# Patient Record
Sex: MALE | Race: WHITE | NOT HISPANIC OR LATINO | Employment: FULL TIME | URBAN - METROPOLITAN AREA
[De-identification: names, ages, dates, MRNs, and addresses within clinical notes are randomized per-mention and may not be internally consistent; named-entity substitution may affect disease eponyms.]

---

## 2017-04-28 ENCOUNTER — HOSPITAL ENCOUNTER (INPATIENT)
Facility: CLINIC | Age: 32
LOS: 8 days | Discharge: HOME | DRG: 339 | End: 2017-05-06
Attending: EMERGENCY MEDICINE | Admitting: HOSPITALIST
Payer: COMMERCIAL

## 2017-04-28 ENCOUNTER — APPOINTMENT (OUTPATIENT)
Dept: CT IMAGING | Facility: CLINIC | Age: 32
DRG: 339 | End: 2017-04-28
Attending: EMERGENCY MEDICINE
Payer: COMMERCIAL

## 2017-04-28 ENCOUNTER — ANESTHESIA EVENT (OUTPATIENT)
Dept: SURGERY | Facility: CLINIC | Age: 32
DRG: 339 | End: 2017-04-28
Payer: COMMERCIAL

## 2017-04-28 ENCOUNTER — ANESTHESIA (OUTPATIENT)
Dept: SURGERY | Facility: CLINIC | Age: 32
DRG: 339 | End: 2017-04-28
Payer: COMMERCIAL

## 2017-04-28 DIAGNOSIS — E86.0 DEHYDRATION: ICD-10-CM

## 2017-04-28 DIAGNOSIS — K35.209 ACUTE APPENDICITIS WITH GENERALIZED PERITONITIS: ICD-10-CM

## 2017-04-28 DIAGNOSIS — R10.84 GENERALIZED ABDOMINAL PAIN: ICD-10-CM

## 2017-04-28 DIAGNOSIS — N28.9 ACUTE RENAL INSUFFICIENCY: ICD-10-CM

## 2017-04-28 DIAGNOSIS — G89.18 ACUTE POST-OPERATIVE PAIN: Primary | ICD-10-CM

## 2017-04-28 LAB
ALBUMIN SERPL-MCNC: 3 G/DL (ref 3.4–5)
ALBUMIN UR-MCNC: 30 MG/DL
ALP SERPL-CCNC: 70 U/L (ref 40–150)
ALT SERPL W P-5'-P-CCNC: 62 U/L (ref 0–70)
ANION GAP SERPL CALCULATED.3IONS-SCNC: 12 MMOL/L (ref 3–14)
APPEARANCE UR: ABNORMAL
AST SERPL W P-5'-P-CCNC: 51 U/L (ref 0–45)
BACTERIA #/AREA URNS HPF: ABNORMAL /HPF
BASOPHILS # BLD AUTO: 0 10E9/L (ref 0–0.2)
BASOPHILS NFR BLD AUTO: 0.3 %
BILIRUB DIRECT SERPL-MCNC: 1.1 MG/DL (ref 0–0.2)
BILIRUB SERPL-MCNC: 2.6 MG/DL (ref 0.2–1.3)
BILIRUB UR QL STRIP: NEGATIVE
BUN SERPL-MCNC: 84 MG/DL (ref 7–30)
CALCIUM SERPL-MCNC: 8.9 MG/DL (ref 8.5–10.1)
CHLORIDE SERPL-SCNC: 84 MMOL/L (ref 94–109)
CO2 SERPL-SCNC: 29 MMOL/L (ref 20–32)
COLOR UR AUTO: YELLOW
CREAT SERPL-MCNC: 2.96 MG/DL (ref 0.66–1.25)
CREAT UR-MCNC: 112 MG/DL
DIFFERENTIAL METHOD BLD: ABNORMAL
EOSINOPHIL # BLD AUTO: 0 10E9/L (ref 0–0.7)
EOSINOPHIL NFR BLD AUTO: 0 %
ERYTHROCYTE [DISTWIDTH] IN BLOOD BY AUTOMATED COUNT: 11.7 % (ref 10–15)
FRACT EXCRET NA UR+SERPL-RTO: 0.3 %
GFR SERPL CREATININE-BSD FRML MDRD: 24 ML/MIN/1.7M2
GLUCOSE SERPL-MCNC: 120 MG/DL (ref 70–99)
GLUCOSE UR STRIP-MCNC: NEGATIVE MG/DL
HCT VFR BLD AUTO: 44.4 % (ref 40–53)
HGB BLD-MCNC: 16.2 G/DL (ref 13.3–17.7)
HGB UR QL STRIP: ABNORMAL
HYALINE CASTS #/AREA URNS LPF: 2 /LPF (ref 0–2)
IMM GRANULOCYTES # BLD: 0.1 10E9/L (ref 0–0.4)
IMM GRANULOCYTES NFR BLD: 0.7 %
KETONES UR STRIP-MCNC: 5 MG/DL
LACTATE SERPL-SCNC: 1.1 MMOL/L (ref 0.4–2)
LEUKOCYTE ESTERASE UR QL STRIP: NEGATIVE
LIPASE SERPL-CCNC: 210 U/L (ref 73–393)
LYMPHOCYTES # BLD AUTO: 0.6 10E9/L (ref 0.8–5.3)
LYMPHOCYTES NFR BLD AUTO: 8.6 %
MCH RBC QN AUTO: 33.3 PG (ref 26.5–33)
MCHC RBC AUTO-ENTMCNC: 36.5 G/DL (ref 31.5–36.5)
MCV RBC AUTO: 91 FL (ref 78–100)
MONOCYTES # BLD AUTO: 0.8 10E9/L (ref 0–1.3)
MONOCYTES NFR BLD AUTO: 10.8 %
MUCOUS THREADS #/AREA URNS LPF: PRESENT /LPF
NEUTROPHILS # BLD AUTO: 5.8 10E9/L (ref 1.6–8.3)
NEUTROPHILS NFR BLD AUTO: 79.6 %
NITRATE UR QL: NEGATIVE
NRBC # BLD AUTO: 0 10*3/UL
NRBC BLD AUTO-RTO: 0 /100
PH UR STRIP: 5 PH (ref 5–7)
PLATELET # BLD AUTO: 158 10E9/L (ref 150–450)
PLATELET # BLD EST: NORMAL 10*3/UL
POTASSIUM SERPL-SCNC: 3.1 MMOL/L (ref 3.4–5.3)
PROT SERPL-MCNC: 8.4 G/DL (ref 6.8–8.8)
RBC # BLD AUTO: 4.87 10E12/L (ref 4.4–5.9)
RBC #/AREA URNS AUTO: 3 /HPF (ref 0–2)
RBC MORPH BLD: ABNORMAL
SODIUM SERPL-SCNC: 125 MMOL/L (ref 133–144)
SODIUM UR-SCNC: 14 MMOL/L
SP GR UR STRIP: 1.01 (ref 1–1.03)
SQUAMOUS #/AREA URNS AUTO: 1 /HPF (ref 0–1)
URN SPEC COLLECT METH UR: ABNORMAL
UROBILINOGEN UR STRIP-MCNC: 2 MG/DL (ref 0–2)
VARIANT LYMPHS BLD QL SMEAR: PRESENT
WBC # BLD AUTO: 7.3 10E9/L (ref 4–11)
WBC #/AREA URNS AUTO: 7 /HPF (ref 0–2)

## 2017-04-28 PROCEDURE — 25800025 ZZH RX 258: Performed by: EMERGENCY MEDICINE

## 2017-04-28 PROCEDURE — 0WJP0ZZ INSPECTION OF GASTROINTESTINAL TRACT, OPEN APPROACH: ICD-10-PCS | Performed by: SURGERY

## 2017-04-28 PROCEDURE — 25000128 H RX IP 250 OP 636: Performed by: EMERGENCY MEDICINE

## 2017-04-28 PROCEDURE — 96375 TX/PRO/DX INJ NEW DRUG ADDON: CPT

## 2017-04-28 PROCEDURE — 87186 SC STD MICRODIL/AGAR DIL: CPT | Performed by: SURGERY

## 2017-04-28 PROCEDURE — 27210794 ZZH OR GENERAL SUPPLY STERILE: Performed by: SURGERY

## 2017-04-28 PROCEDURE — 74176 CT ABD & PELVIS W/O CONTRAST: CPT

## 2017-04-28 PROCEDURE — 96366 THER/PROPH/DIAG IV INF ADDON: CPT

## 2017-04-28 PROCEDURE — 36000056 ZZH SURGERY LEVEL 3 1ST 30 MIN: Performed by: SURGERY

## 2017-04-28 PROCEDURE — 81001 URINALYSIS AUTO W/SCOPE: CPT | Performed by: EMERGENCY MEDICINE

## 2017-04-28 PROCEDURE — 12000000 ZZH R&B MED SURG/OB

## 2017-04-28 PROCEDURE — 87077 CULTURE AEROBIC IDENTIFY: CPT | Performed by: SURGERY

## 2017-04-28 PROCEDURE — 87205 SMEAR GRAM STAIN: CPT | Performed by: SURGERY

## 2017-04-28 PROCEDURE — 25000128 H RX IP 250 OP 636: Performed by: NURSE ANESTHETIST, CERTIFIED REGISTERED

## 2017-04-28 PROCEDURE — 40000306 ZZH STATISTIC PRE PROC ASSESS II: Performed by: SURGERY

## 2017-04-28 PROCEDURE — 87076 CULTURE ANAEROBE IDENT EACH: CPT | Performed by: SURGERY

## 2017-04-28 PROCEDURE — 96365 THER/PROPH/DIAG IV INF INIT: CPT

## 2017-04-28 PROCEDURE — 88304 TISSUE EXAM BY PATHOLOGIST: CPT | Mod: 26 | Performed by: SURGERY

## 2017-04-28 PROCEDURE — 44960 APPENDECTOMY: CPT | Mod: 22 | Performed by: SURGERY

## 2017-04-28 PROCEDURE — 80076 HEPATIC FUNCTION PANEL: CPT | Performed by: EMERGENCY MEDICINE

## 2017-04-28 PROCEDURE — 83690 ASSAY OF LIPASE: CPT | Performed by: EMERGENCY MEDICINE

## 2017-04-28 PROCEDURE — 80048 BASIC METABOLIC PNL TOTAL CA: CPT | Performed by: EMERGENCY MEDICINE

## 2017-04-28 PROCEDURE — 83605 ASSAY OF LACTIC ACID: CPT | Performed by: EMERGENCY MEDICINE

## 2017-04-28 PROCEDURE — 27210995 ZZH RX 272: Performed by: SURGERY

## 2017-04-28 PROCEDURE — 84300 ASSAY OF URINE SODIUM: CPT | Performed by: HOSPITALIST

## 2017-04-28 PROCEDURE — 25000125 ZZHC RX 250: Performed by: SURGERY

## 2017-04-28 PROCEDURE — 25000128 H RX IP 250 OP 636: Performed by: SURGERY

## 2017-04-28 PROCEDURE — 44960 APPENDECTOMY: CPT | Mod: AS | Performed by: PHYSICIAN ASSISTANT

## 2017-04-28 PROCEDURE — 85025 COMPLETE CBC W/AUTO DIFF WBC: CPT | Performed by: EMERGENCY MEDICINE

## 2017-04-28 PROCEDURE — 99222 1ST HOSP IP/OBS MODERATE 55: CPT | Mod: 57 | Performed by: SURGERY

## 2017-04-28 PROCEDURE — 88304 TISSUE EXAM BY PATHOLOGIST: CPT | Performed by: SURGERY

## 2017-04-28 PROCEDURE — 37000008 ZZH ANESTHESIA TECHNICAL FEE, 1ST 30 MIN: Performed by: SURGERY

## 2017-04-28 PROCEDURE — 87070 CULTURE OTHR SPECIMN AEROBIC: CPT | Performed by: SURGERY

## 2017-04-28 PROCEDURE — 99223 1ST HOSP IP/OBS HIGH 75: CPT | Mod: AI | Performed by: HOSPITALIST

## 2017-04-28 PROCEDURE — 25000128 H RX IP 250 OP 636

## 2017-04-28 PROCEDURE — 25000566 ZZH SEVOFLURANE, EA 15 MIN: Performed by: SURGERY

## 2017-04-28 PROCEDURE — 37000009 ZZH ANESTHESIA TECHNICAL FEE, EACH ADDTL 15 MIN: Performed by: SURGERY

## 2017-04-28 PROCEDURE — 87075 CULTR BACTERIA EXCEPT BLOOD: CPT | Performed by: SURGERY

## 2017-04-28 PROCEDURE — 96361 HYDRATE IV INFUSION ADD-ON: CPT

## 2017-04-28 PROCEDURE — 82570 ASSAY OF URINE CREATININE: CPT | Performed by: HOSPITALIST

## 2017-04-28 PROCEDURE — 0DTJ4ZZ RESECTION OF APPENDIX, PERCUTANEOUS ENDOSCOPIC APPROACH: ICD-10-PCS | Performed by: SURGERY

## 2017-04-28 PROCEDURE — 25800025 ZZH RX 258: Performed by: SURGERY

## 2017-04-28 PROCEDURE — 71000013 ZZH RECOVERY PHASE 1 LEVEL 1 EA ADDTL HR: Performed by: SURGERY

## 2017-04-28 PROCEDURE — 36000058 ZZH SURGERY LEVEL 3 EA 15 ADDTL MIN: Performed by: SURGERY

## 2017-04-28 PROCEDURE — C1765 ADHESION BARRIER: HCPCS | Performed by: SURGERY

## 2017-04-28 PROCEDURE — 25000125 ZZHC RX 250: Performed by: NURSE ANESTHETIST, CERTIFIED REGISTERED

## 2017-04-28 PROCEDURE — 99285 EMERGENCY DEPT VISIT HI MDM: CPT | Mod: 25

## 2017-04-28 PROCEDURE — 25000128 H RX IP 250 OP 636: Performed by: ANESTHESIOLOGY

## 2017-04-28 PROCEDURE — 71000012 ZZH RECOVERY PHASE 1 LEVEL 1 FIRST HR: Performed by: SURGERY

## 2017-04-28 RX ORDER — BUPIVACAINE HYDROCHLORIDE AND EPINEPHRINE 5; 5 MG/ML; UG/ML
INJECTION, SOLUTION PERINEURAL PRN
Status: DISCONTINUED | OUTPATIENT
Start: 2017-04-28 | End: 2017-04-29 | Stop reason: HOSPADM

## 2017-04-28 RX ORDER — FENTANYL CITRATE 50 UG/ML
INJECTION, SOLUTION INTRAMUSCULAR; INTRAVENOUS PRN
Status: DISCONTINUED | OUTPATIENT
Start: 2017-04-28 | End: 2017-04-29

## 2017-04-28 RX ORDER — ACETAMINOPHEN 10 MG/ML
INJECTION, SOLUTION INTRAVENOUS PRN
Status: DISCONTINUED | OUTPATIENT
Start: 2017-04-28 | End: 2017-04-29

## 2017-04-28 RX ORDER — ONDANSETRON 2 MG/ML
INJECTION INTRAMUSCULAR; INTRAVENOUS
Status: COMPLETED
Start: 2017-04-28 | End: 2017-04-28

## 2017-04-28 RX ORDER — HYDROMORPHONE HYDROCHLORIDE 1 MG/ML
INJECTION, SOLUTION INTRAMUSCULAR; INTRAVENOUS; SUBCUTANEOUS
Status: COMPLETED
Start: 2017-04-28 | End: 2017-04-28

## 2017-04-28 RX ORDER — ONDANSETRON 2 MG/ML
4 INJECTION INTRAMUSCULAR; INTRAVENOUS ONCE
Status: DISCONTINUED | OUTPATIENT
Start: 2017-04-28 | End: 2017-04-29

## 2017-04-28 RX ORDER — KETAMINE HYDROCHLORIDE 10 MG/ML
INJECTION, SOLUTION INTRAMUSCULAR; INTRAVENOUS PRN
Status: DISCONTINUED | OUTPATIENT
Start: 2017-04-28 | End: 2017-04-29

## 2017-04-28 RX ORDER — MAGNESIUM HYDROXIDE 1200 MG/15ML
LIQUID ORAL PRN
Status: DISCONTINUED | OUTPATIENT
Start: 2017-04-28 | End: 2017-04-29 | Stop reason: HOSPADM

## 2017-04-28 RX ORDER — ERTAPENEM 1 G/1
1 INJECTION, POWDER, LYOPHILIZED, FOR SOLUTION INTRAMUSCULAR; INTRAVENOUS ONCE
Status: COMPLETED | OUTPATIENT
Start: 2017-04-28 | End: 2017-04-28

## 2017-04-28 RX ORDER — ONDANSETRON 2 MG/ML
INJECTION INTRAMUSCULAR; INTRAVENOUS PRN
Status: DISCONTINUED | OUTPATIENT
Start: 2017-04-28 | End: 2017-04-29

## 2017-04-28 RX ORDER — DEXAMETHASONE SODIUM PHOSPHATE 4 MG/ML
INJECTION, SOLUTION INTRA-ARTICULAR; INTRALESIONAL; INTRAMUSCULAR; INTRAVENOUS; SOFT TISSUE PRN
Status: DISCONTINUED | OUTPATIENT
Start: 2017-04-28 | End: 2017-04-29

## 2017-04-28 RX ORDER — LIDOCAINE HYDROCHLORIDE 10 MG/ML
INJECTION, SOLUTION INFILTRATION; PERINEURAL PRN
Status: DISCONTINUED | OUTPATIENT
Start: 2017-04-28 | End: 2017-04-29

## 2017-04-28 RX ORDER — NEOSTIGMINE METHYLSULFATE 1 MG/ML
VIAL (ML) INJECTION PRN
Status: DISCONTINUED | OUTPATIENT
Start: 2017-04-28 | End: 2017-04-29

## 2017-04-28 RX ORDER — SODIUM CHLORIDE 9 MG/ML
INJECTION, SOLUTION INTRAVENOUS CONTINUOUS
Status: DISCONTINUED | OUTPATIENT
Start: 2017-04-28 | End: 2017-04-29

## 2017-04-28 RX ORDER — SODIUM CHLORIDE, SODIUM LACTATE, POTASSIUM CHLORIDE, CALCIUM CHLORIDE 600; 310; 30; 20 MG/100ML; MG/100ML; MG/100ML; MG/100ML
INJECTION, SOLUTION INTRAVENOUS CONTINUOUS
Status: DISCONTINUED | OUTPATIENT
Start: 2017-04-28 | End: 2017-04-29

## 2017-04-28 RX ORDER — PROPOFOL 10 MG/ML
INJECTION, EMULSION INTRAVENOUS PRN
Status: DISCONTINUED | OUTPATIENT
Start: 2017-04-28 | End: 2017-04-29

## 2017-04-28 RX ORDER — GLYCOPYRROLATE 0.2 MG/ML
INJECTION, SOLUTION INTRAMUSCULAR; INTRAVENOUS PRN
Status: DISCONTINUED | OUTPATIENT
Start: 2017-04-28 | End: 2017-04-29

## 2017-04-28 RX ORDER — HYDROMORPHONE HYDROCHLORIDE 1 MG/ML
0.5 INJECTION, SOLUTION INTRAMUSCULAR; INTRAVENOUS; SUBCUTANEOUS
Status: DISCONTINUED | OUTPATIENT
Start: 2017-04-28 | End: 2017-04-29

## 2017-04-28 RX ADMIN — KETAMINE HYDROCHLORIDE 10 MG: 10 INJECTION, SOLUTION INTRAMUSCULAR; INTRAVENOUS at 22:24

## 2017-04-28 RX ADMIN — GLYCOPYRROLATE 0.06 MG: 0.2 INJECTION, SOLUTION INTRAMUSCULAR; INTRAVENOUS at 23:38

## 2017-04-28 RX ADMIN — MIDAZOLAM HYDROCHLORIDE 2 MG: 1 INJECTION, SOLUTION INTRAMUSCULAR; INTRAVENOUS at 20:55

## 2017-04-28 RX ADMIN — ROCURONIUM BROMIDE 10 MG: 10 INJECTION INTRAVENOUS at 21:55

## 2017-04-28 RX ADMIN — PHENYLEPHRINE HYDROCHLORIDE 100 MCG: 10 INJECTION, SOLUTION INTRAMUSCULAR; INTRAVENOUS; SUBCUTANEOUS at 22:53

## 2017-04-28 RX ADMIN — ROCURONIUM BROMIDE 20 MG: 10 INJECTION INTRAVENOUS at 22:34

## 2017-04-28 RX ADMIN — PROPOFOL 200 MG: 10 INJECTION, EMULSION INTRAVENOUS at 21:05

## 2017-04-28 RX ADMIN — LIDOCAINE HYDROCHLORIDE 30 MG: 10 INJECTION, SOLUTION INFILTRATION; PERINEURAL at 21:05

## 2017-04-28 RX ADMIN — ONDANSETRON 4 MG: 2 INJECTION INTRAMUSCULAR; INTRAVENOUS at 13:47

## 2017-04-28 RX ADMIN — HYDROMORPHONE HYDROCHLORIDE 1 MG: 1 INJECTION, SOLUTION INTRAMUSCULAR; INTRAVENOUS; SUBCUTANEOUS at 22:42

## 2017-04-28 RX ADMIN — HYDROMORPHONE HYDROCHLORIDE 0.5 MG: 1 INJECTION, SOLUTION INTRAMUSCULAR; INTRAVENOUS; SUBCUTANEOUS at 22:06

## 2017-04-28 RX ADMIN — ROCURONIUM BROMIDE 50 MG: 10 INJECTION INTRAVENOUS at 21:05

## 2017-04-28 RX ADMIN — SODIUM CHLORIDE: 9 INJECTION, SOLUTION INTRAVENOUS at 20:44

## 2017-04-28 RX ADMIN — KETAMINE HYDROCHLORIDE 10 MG: 10 INJECTION, SOLUTION INTRAMUSCULAR; INTRAVENOUS at 22:53

## 2017-04-28 RX ADMIN — ERTAPENEM SODIUM 1 G: 1 INJECTION, POWDER, LYOPHILIZED, FOR SOLUTION INTRAMUSCULAR; INTRAVENOUS at 16:39

## 2017-04-28 RX ADMIN — ROCURONIUM BROMIDE 20 MG: 10 INJECTION INTRAVENOUS at 23:10

## 2017-04-28 RX ADMIN — ACETAMINOPHEN 1000 MG: 10 INJECTION, SOLUTION INTRAVENOUS at 23:40

## 2017-04-28 RX ADMIN — FENTANYL CITRATE 150 MCG: 50 INJECTION, SOLUTION INTRAMUSCULAR; INTRAVENOUS at 21:05

## 2017-04-28 RX ADMIN — KETAMINE HYDROCHLORIDE 20 MG: 10 INJECTION, SOLUTION INTRAMUSCULAR; INTRAVENOUS at 21:22

## 2017-04-28 RX ADMIN — HYDROMORPHONE HYDROCHLORIDE 0.5 MG: 1 INJECTION, SOLUTION INTRAMUSCULAR; INTRAVENOUS; SUBCUTANEOUS at 15:49

## 2017-04-28 RX ADMIN — ONDANSETRON 4 MG: 2 INJECTION INTRAMUSCULAR; INTRAVENOUS at 23:24

## 2017-04-28 RX ADMIN — SODIUM CHLORIDE 1000 ML: 9 INJECTION, SOLUTION INTRAVENOUS at 13:47

## 2017-04-28 RX ADMIN — FENTANYL CITRATE 50 MCG: 50 INJECTION, SOLUTION INTRAMUSCULAR; INTRAVENOUS at 23:46

## 2017-04-28 RX ADMIN — FENTANYL CITRATE 50 MCG: 50 INJECTION, SOLUTION INTRAMUSCULAR; INTRAVENOUS at 21:17

## 2017-04-28 RX ADMIN — HYDROMORPHONE HYDROCHLORIDE 0.5 MG: 1 INJECTION, SOLUTION INTRAMUSCULAR; INTRAVENOUS; SUBCUTANEOUS at 21:42

## 2017-04-28 RX ADMIN — SODIUM CHLORIDE, POTASSIUM CHLORIDE, SODIUM LACTATE AND CALCIUM CHLORIDE: 600; 310; 30; 20 INJECTION, SOLUTION INTRAVENOUS at 20:59

## 2017-04-28 RX ADMIN — SODIUM CHLORIDE 500 ML: 9 INJECTION, SOLUTION INTRAVENOUS at 15:00

## 2017-04-28 RX ADMIN — DEXAMETHASONE SODIUM PHOSPHATE 4 MG: 4 INJECTION, SOLUTION INTRA-ARTICULAR; INTRALESIONAL; INTRAMUSCULAR; INTRAVENOUS; SOFT TISSUE at 21:05

## 2017-04-28 RX ADMIN — SODIUM CHLORIDE, POTASSIUM CHLORIDE, SODIUM LACTATE AND CALCIUM CHLORIDE: 600; 310; 30; 20 INJECTION, SOLUTION INTRAVENOUS at 22:25

## 2017-04-28 RX ADMIN — Medication 4 MG: at 23:38

## 2017-04-28 RX ADMIN — SODIUM CHLORIDE, POTASSIUM CHLORIDE, SODIUM LACTATE AND CALCIUM CHLORIDE: 600; 310; 30; 20 INJECTION, SOLUTION INTRAVENOUS at 16:30

## 2017-04-28 RX ADMIN — KETAMINE HYDROCHLORIDE 10 MG: 10 INJECTION, SOLUTION INTRAMUSCULAR; INTRAVENOUS at 23:24

## 2017-04-28 ASSESSMENT — ENCOUNTER SYMPTOMS
NAUSEA: 1
VOMITING: 1
CHILLS: 1
DIARRHEA: 1
ABDOMINAL PAIN: 1
APPETITE CHANGE: 1

## 2017-04-28 NOTE — H&P
HISTORY AND PHYSICAL     PRIMARY CARE PHYSICIAN:  None.      CHIEF COMPLAINT:  Abdominal pain.      HISTORY OF PRESENT ILLNESS:  Jaspreet Whaley is a healthy 31-year-old male presenting to [facility] for about 5 days of nausea, vomiting, diarrhea and abdominal pain.  The patient states that his symptoms began on Sunday.  At that time he began experiencing some nausea and vomiting.  He also describes some chills and sweats.  No objective fevers.  Over the course of the subsequent several days, he developed bilateral lower abdominal and almost pelvic pain.  This slowly actually migrated to the right side.  He has been anorexic with decreased appetite and not eaten much over the past several days.  He does report making urine, but it has been darker in color.  He reports adequate amount of urine output.  He reports having bowel movements, but more liquid consistency such as diarrhea.  No constipation.  Due to progression of his symptoms, he came to the Emergency Department for evaluation.  To note, he has been not using any medications like acetaminophen or ibuprofen over the past several days for pain management.      Upon presentation to the Emergency Department, the patient's vital signs showed a temperature of 97.8, heart rate 106, blood pressure 125/89, respiratory rate 16, oxygen saturation 98% on room air.  He had a somewhat distended abdomen. Labs showed sodium of 125, potassium 3.1, chloride 84, bicarbonate 29, BUN 84, creatinine 2.9, albumin 3.0, bilirubin 2.6, AST 51, and lipase of 210.  CBC is essentially normal.  Blood sugar is 120.  Urinalysis shows 5 ketones, 30 protein, small blood, 7 white blood cells, 3 red blood cells and few bacteria.  CT of the abdomen and pelvis without contrast was performed showing a prominent appendicolith at the base of the appendix, which appears to be thickened and findings are suspicious for appendicitis, possibly ruptured.  There is also within fluid  collection in the right lower quadrant along the lateral aspect of the cecum and ascending colon, which is suspicious for an abscess related to ruptured appendicitis.  There are multiple prominently dilated loops of mid and proximal small bowel with a zone of transition and a mildly thickened segment of mid small bowel in the left mid abdomen.  This could be consistent with a small-bowel obstruction.  There are diffuse fatty infiltration and trace amounts of free fluid in the pelvis.        Dr. Granger with General Surgery Service was consulted by phone.  He is currently in the operating room, but will be assessing the patient as soon as he is available.  The patient is currently on IV fluids and n.p.o.  He received a dose of Invanz.  The likelihood of going to the operating room tonight is very high.  The patient has no other complaints at this time.      PAST MEDICAL HISTORY:  None.      PRIOR TO ADMISSION MEDICATIONS:  None.      SOCIAL HISTORY:  The patient is single.  No children.  Parents at the bedside.  He does not smoke, drink or use drugs.      FAMILY HISTORY:  Assessed and noncontributory.      ALLERGIES:  No known drug allergies.      REVIEW OF SYSTEMS:  A 10-point review of systems was performed and the pertinent positive findings are presented in history present illness.  The remainder of the review of systems is negative.      PHYSICAL EXAMINATION:   VITAL SIGNS:  Temperature 97.8, heart rate 106, blood pressure 125/89, respiratory rate 16, oxygen saturation 98% on room air.   IN GENERAL:  No apparent distress, awake, alert and oriented x3.   HEENT:  Normocephalic, atraumatic.  Extraocular movements are intact.   NECK:  Supple without JVD.   CARDIOVASCULAR:  Mildly tachycardic, but regular.  No murmurs, rubs or gallops.   PULMONARY:  Clear to auscultation bilaterally.   ABDOMEN:  Soft but tender to palpation diffusely, most exquisitely in the right lower quadrant.  Mildly distended.  Some rebound as  well as voluntary guarding.   EXTREMITIES:  No cyanosis or clubbing.  No edema.   SKIN:  No rashes, ulcers or jaundice.   NEUROLOGICAL:  Cranial nerves II through XII are grossly intact.  No focal neurologic deficits.   PSYCHIATRIC:  Mood and affect are appropriate.      LABORATORY AND IMAGING:  Personally reviewed and discussed pertinent findings in the HPI.      ASSESSMENT AND PLAN:  This is a 31-year-old fundamentally healthy male presenting to [facility] for evaluation of nausea, vomiting and abdominal pain, and ultimately found to have a likely ruptured appendicitis as well as acute renal failure and hyponatremia.   1.  Suspected acute ruptured appendicitis:  Computerized tomography findings are somewhat limited due to lack of contrast; however, his right lower quadrant pain as well as the computerized tomography findings do suggest likely a case of ruptured appendicitis.  He has been given a dose of ertapenem.  We will transition him over to Zosyn.  [facility] General Surgery consultation has been requested and the patient will likely need to go to the operating room tonight.  We will continue intravenous fluids as well as n.p.o. status.  Pain and nausea medications have been ordered, but he appears relatively comfortable for the time being.  He is hemodynamically stable at this time.  A lactic acid level was pending.   2.  Possible small-bowel obstruction:  Again, computerized tomography findings are limited due to no contrast.  He does report having stool output, but in the form of diarrhea.  He could have a bowel obstruction related to his appendicitis, but this has not been confirmed.  Again, Surgery consultation has been requested to evaluate these intra-abdominal issues further.   3.  Acute renal failure:  The patient's creatinine is elevated to nearly 3.0.  At baseline, he has normal renal function.  This is almost certainly prerenal with possible component of ischemic acute tubular necrosis.   We will continue normal saline at 125 cc an hour and closely monitor his urine output.  He does report making adequate amounts of urine at home.  He denies intake of any other nephrotoxic agents, particularly NSAIDs.  I did add a FENA on, which is 0.3 further supporting prerenal etiology.   4.  Hypovolemic hyponatremia:  Likely acute in the setting of nausea and vomiting with limited oral intake.  We will provide normal saline and recheck a sodium level later tonight as well as tomorrow morning.   5.  Hyperbilirubinemia:  Likely related to sepsis.  We will recheck this later on.  No obvious biliary obstructive issues are noted.   6.  The patient will be admitted to inpatient status.   7.  Code status:  Full code.         KENDRA DANG MD             D: 2017 16:42   T: 2017 17:16   MT: JUN#[id number]      Name:     ALMA MORAN   MRN:      [id number]        Account:      [id number]   :      1985           Admitted:     [id number]      Document: [id number]

## 2017-04-28 NOTE — IP AVS SNAPSHOT
MRN:[id number]                      After Visit Summary   4/28/2017    Jaspreet Whaley    MRN: [id number]           Thank you!     Thank you for choosing [facility] for your care. Our goal is always to provide you with excellent care. Hearing back from our patients is one way we can continue to improve our services. Please take a few minutes to complete the written survey that you may receive in the mail after you visit. If you would like to speak to someone directly about your visit please contact Patient Relations at [phone]. Thank you!          Patient Information     Date Of Birth          1985        Designated Caregiver       Most Recent Value    Caregiver    Will someone help with your care after discharge? yes    Name of designated caregiver mom and dad,Domenica and Kwabena Whaley    Phone number of caregiver [phone]    Caregiver address [address]      About your hospital stay     You were admitted on:  April 28, 2017 You last received care in the:  [facility]    You were discharged on:  May 6, 2017       Who to Call     For medical emergencies, please call 911.  For non-urgent questions about your medical care, please call your primary care provider or clinic, [phone]  For questions related to your surgery, please call your surgery clinic        Attending Provider     Provider Specialty    Lukas Stone MD Emergency Medicine    Bath, Jean Kapadia MD Surgery    Johnathon Palacio DO Internal Medicine       Primary Care Provider Office Phone # Fax #    Sam DYAN Serrano [phone]       [facility] [place] [address]  [facility] [address]        Further instructions from your care team       A post hospital follow up has been scheduled for you with your PCP, Dr. Serrano at [facility] in [place].    Appointment scheduled for 5/15 at 11:00am.    Please arrive 15 minutes prior to  appointment.  Please bring photo ID and Insurance information.    Clinic Address  [address] [place] [address]    HOME CARE FOLLOWING ABDOMINAL SURGERY  GISELA Kirkland, JOHNNY Ott D. Maurer, KELVIN Escobedo     Special instructions for Jaspreet Ku Judd:  --Staples to be removed May 9 or 10  -Antibiotic to be taken twice daily for 3 days (Augmentin: Amoxicillin/Clavulanate)     INCISIONAL CARE:  Replace the bandage over your incision (or incisions) until all drainage stops, or if more comfortable to have in place.  If present, leave the steri-strips (white paper tapes) in place for 14 days after surgery.  If you have staples in your incision at the time of discharge, they will be removed at your follow-up appointment.  If Dermabond (a type of skin glue) is present, leave in place until it wears/flakes off.     BATHING:  Avoid baths for 1 week after surgery.  Showers are okay.  You may wash your hair at any time.  Gently pat your incision dry after bathing.    ACTIVITY:  Light Activity -- you may immediately be up and about as tolerated.  Driving -- you may drive when comfortable and off narcotic pain medications.  Light Work -- resume when comfortable off pain medications.  (If you can drive, you probably can work.)  Strenuous Work/Activity -- limit lifting to 20 pounds for 4 weeks.  Then, progressively increase with time.  Active Sports (running, biking, etc.) -- cautiously resume after 6 weeks.    DISCOMFORT:  Use pain medications as prescribed by your surgeon.  Take the pain medication with some food, when possible, to minimize side effects.  Expect gradual improvement.    DIET:  Return to diet you were on before surgery, unless you are given specific diet instructions.  Drink plenty of fluids.  While taking pain medications, increase dietary fiber or add a fiber supplementation like Metamucil or Citrucel to help prevent constipation - a possible side effect of  pain medications.    NAUSEA:  If nauseated from the anesthetic/pain meds; rest in bed, get up cautiously with assistance, and drink clear liquids (juice, tea, broth).    RETURN APPOINTMENT:  Schedule a follow-up visit 2-3 weeks after discharge from the hospital.  Office Phone:  [phone]     CONTACT US IF THE FOLLOWING DEVELOPS:   1. A fever that is above 101     2. If there is a large amount of drainage, bleeding, or swelling.   3. Severe pain that is not relieved by your prescription.   4. Drainage that is thick, cloudy, yellow, green or white.   5. Any other questions not answered by  Frequently Asked Questions  sheet.      FREQUENTLY ASKED QUESTIONS:    Q:  How should my incision look?    A:  Normally your incision will appear slightly swollen with light redness directly along the incision itself as it heals.  It may feel like a bump or ridge as the healing/scarring happens, and over time (3-4 months) this bump or ridge feeling should slowly go away.  In general, clear or pink watery drainage can be normal at first as your incision heals, but should decrease over time.    Q:  How do I know if my incision is infected?  A:  Look at your incision for signs of infection, like redness around the incision spreading to surrounding skin, or drainage of cloudy or foul-smelling drainage.  If you feel warm, check your temperature to see if you are running a fever.    **If any of these things occur, please notify the nurse at our office.  We may need you to come into the office for an incision check.      Q:  How do I take care of my incision?  A:  If you have a dressing in place - Starting the day after surgery, replace the dressing 1-2 times a day until there is no further drainage from the incision.  At that time, a dressing is no longer needed.  Try to minimize tape on the skin if irritation is occurring at the tape sites.  If you have significant irritation from tape on the skin, please call the office to discuss  other method of dressing your incision.    Small pieces of tape called  steri-strips  may be present directly overlying your incision; these may be removed 10 days after surgery unless otherwise specified by your surgeon.  If these tapes start to loosen at the ends, you may trim them back until they fall off or are removed.    A:  If you had  Dermabond  tissue glue used as a dressing (this causes your incision to look shiny with a clear covering over it) - This type of dressing wears off with time and does not require more dressings over the top unless it is draining around the glue as it wears off.  Do not apply ointments or lotions over the incisions until the glue has completely worn off.    Q:  There is a piece of tape or a sticky  lead  still on my skin.  Can I remove this?  A:  Sometimes the sticky  leads  used for monitoring during surgery or for evaluation in the emergency department are not all removed while you are in the hospital.  These sometimes have a tab or metal dot on them.  You can easily remove these on your own, like taking off a band-aid.  If there is a gel substance under the  lead , simply wipe/clean it off with a washcloth or paper towel.      Q:  What can I do to minimize constipation (very hard stools, or lack of stools)?  A:  Stay well hydrated.  Increase your dietary fiber intake or take a fiber supplement -with plenty of water.  Walk around frequently.  You may consider an over-the-counter stool-softener.  Your Pharmacist can assist you with choosing one that is stocked at your pharmacy.  Constipation is also one of the most common side effects of pain medication.  If you are using pain medication, be pro-active and try to PREVENT problems with constipation by taking the steps above BEFORE constipation becomes a problem.    Q:  What do I do if I need more pain medications?  A:  Call the office to receive refills.  Be aware that certain pain meds cannot be called into a pharmacy and  actually require a paper prescription.  A change may be made in your pain med as you progress thru your recovery period or if you have side effects to certain meds.    --Pain meds are NOT refilled after 5pm on weekdays, and NOT AT ALL on the weekends, so please look ahead to prevent problems.      Q:  Why am I having a hard time sleeping now that I am at home?  A:  Many medications you receive while you are in the hospital can impact your sleep for a number of days after your surgery/hospitalization.  Decreased level of activity and naps during the day may also make sleeping at night difficult.  Try to minimize day-time naps, and get up frequently during the day to walk around your home during your recovery time.  Sleep aides may be of some help, but are not recommended for long-term use.      Q:  I am having some back discomfort.  What should I do?  A:  This may be related to certain positioning that was required for your surgery, extended periods of time in bed, or other changes in your overall activity level.  You may try ice, heat, acetaminophen, or ibuprofen to treat this temporarily.  Note that many pain medications have acetaminophen in them and would state this on the prescription bottle.  Be sure not to exceed the maximum of 4000mg per day of acetaminophen.     **If the pain you are having does not resolve, is severe, or is a flare of back pain you have had on other occasions prior to surgery, please contact your primary physician for further recommendations or for an appointment to be examined at their office.    Q:  Why am I having headaches?  A:  Headaches can be caused by many things:  caffeine withdrawal, use of pain meds, dehydration, high blood pressure, lack of sleep, over-activity/exhaustion, flare-up of usual migraine headaches.  If you feel this is related to muscle tension (a band-like feeling around the head, or a pressure at the low-back of the head) you may try ice or heat to this area.  You  may need to drink more fluids (try electrolyte drink like Gatorade), rest, or take your usual migraine medications.   **If your headaches do not resolve, worsen, are accompanied by other symptoms, or if your blood pressure is high, please call your primary physician for recommendation and/or examination.    Q:  I am unable to urinate.  What do I do?  A:  A small percentage of people can have difficulty urinating initially after surgery.  This includes being able to urinate only a very small amount at a time and feeling discomfort or pressure in the very low abdomen.  This is called  urinary retention , and is actually an urgent situation.  Proceed to your nearest Emergency department for evaluation (not an Urgent Care Center).  Sometimes the bladder does not work correctly after certain medications you receive during surgery, or related to certain procedures.  You may need to have a catheter placed until your bladder recovers.  When planning to go to an Emergency department, it may help to call the ER to let them know you are coming in for this problem after a surgery.  This may help you get in quicker to be evaluated.  **If you have symptoms of a urinary tract infection, please contact your primary physician for the proper evaluation and treatment.          If you have other questions, please call the office Monday thru Friday between 8am and 5pm to discuss with the nurse or physician assistant.  #[phone]    There is a surgeon ON CALL on weekday evenings and over the weekend in case of urgent need only, and may be contacted at the same number.    If you are having an emergency, call 911 or proceed to your nearest emergency department.      [phone]      Pending Results     No orders found from 4/26/2017 to 4/29/2017.            Statement of Approval     Ordered          05/06/17 0837  I have reviewed and agree with all the recommendations and orders detailed in this document.  EFFECTIVE NOW     Approved  "and electronically signed by:  Sridhar Prather PA-C             Admission Information     Date & Time Provider Department Dept. Phone    2017 Johnathon Palacio DO [facility] [phone]      Your Vitals Were     Blood Pressure Pulse Temperature Respirations Height Weight    141/93 91 97.9  F (36.6  C) (Oral) 16 1.829 m (6') 90.5 kg (199 lb 8.3 oz)    Pulse Oximetry BMI (Body Mass Index)                97% 27.06 kg/m2          MyChart Information     [employer] lets you send messages to your doctor, view your test results, renew your prescriptions, schedule appointments and more. To sign up, go to [email or web address] . Click on \"Log in\" on the left side of the screen, which will take you to the Welcome page. Then click on \"Sign up Now\" on the right side of the page.     You will be asked to enter the access code listed below, as well as some personal information. Please follow the directions to create your username and password.     Your access code is: [email or web address]  Expires: 2017  4:48 PM     Your access code will  in 90 days. If you need help or a new code, please call your [place] clinic or [phone].        Care EveryWhere ID     This is your Care EveryWhere ID. This could be used by other organizations to access your [place] medical records  [id number]           Review of your medicines      START taking        Dose / Directions    amoxicillin-clavulanate 875-125 MG per tablet   Commonly known as:  AUGMENTIN        Dose:  1 tablet   Take 1 tablet by mouth 2 times daily for 3 days   Quantity:  6 tablet   Refills:  0       oxyCODONE 5 MG IR tablet   Commonly known as:  ROXICODONE   Used for:  Acute post-operative pain        Dose:  5-10 mg   Take 1-2 tablets (5-10 mg) by mouth every 6 hours as needed for severe pain   Quantity:  30 tablet   Refills:  0            Where to get your medicines      These medications were sent to [facility] - " [place] - [address] [address]  [address] [facility] [address]     Phone:  [phone]     amoxicillin-clavulanate 875-125 MG per tablet         Some of these will need a paper prescription and others can be bought over the counter. Ask your nurse if you have questions.     Bring a paper prescription for each of these medications     oxyCODONE 5 MG IR tablet                Protect others around you: Learn how to safely use, store and throw away your medicines at www.disposemymeds.org.             Medication List: This is a list of all your medications and when to take them. Check marks below indicate your daily home schedule. Keep this list as a reference.      Medications           Morning Afternoon Evening Bedtime As Needed    amoxicillin-clavulanate 875-125 MG per tablet   Commonly known as:  AUGMENTIN   Take 1 tablet by mouth 2 times daily for 3 days                                oxyCODONE 5 MG IR tablet   Commonly known as:  ROXICODONE   Take 1-2 tablets (5-10 mg) by mouth every 6 hours as needed for severe pain   Last time this was given:  10 mg on 5/6/2017 12:20 AM

## 2017-04-28 NOTE — ED NOTES
Pt ambulated to bed; now resting, in no apparent distress. Patient's airway, breathing and circulation are all intact without need for intervention at this time. Respiration regular and easy. Patient is alert and oriented x4. Skin warm, dry with color consistent with ethnicity. Bowel sounds are normoactive; abd is soft, slightly distended. No obvious signs of injury.

## 2017-04-28 NOTE — IP AVS SNAPSHOT
[facility]    [address]    [facility] [address]    Phone:  [phone]    Fax:  [phone]                                       After Visit Summary   4/28/2017    Jaspreet Whaley    MRN: [id number]           After Visit Summary Signature Page     I have received my discharge instructions, and my questions have been answered. I have discussed any challenges I see with this plan with the nurse or doctor.    ..........................................................................................................................................  Patient/Patient Representative Signature      ..........................................................................................................................................  Patient Representative Print Name and Relationship to Patient    ..................................................               ................................................  Date                                            Time    ..........................................................................................................................................  Reviewed by Signature/Title    ...................................................              ..............................................  Date                                                            Time

## 2017-04-28 NOTE — ED PROVIDER NOTES
History     Chief Complaint:  Nausea, Vomiting, & Diarrhea    HPI   Jaspreet Kings Whaley is a 31 year old male who presents with nausea, vomiting, and diarrhea. Patient reports his symptoms began five days ago when he started experiencing chills. This progressed to nausea, vomiting, and diarrhea. He had 4 episodes of diarrhea yesterday. Last vomited last night. He has had a reduced appetite since the symptoms began. He cannot recall eating anything unusual. He has also had some abdominal pain over the past few days. The pain is most significant in his RLQ. No prior abdominal surgeries.    Allergies:  The patient has no known drug allergies.    Medications:    The patient is currently on no regular medications.     Past Medical History:    History reviewed.  No significant past medical history.     Past Surgical History:    History reviewed.  No significant past surgical history.    Family History:    History reviewed.  No significant family history.     Social History:  Relationship status: Single  Tobacco use: Negative  Alcohol use: Positive  The patient presents with his parents.      Review of Systems   Constitutional: Positive for appetite change and chills.   Gastrointestinal: Positive for abdominal pain, diarrhea, nausea and vomiting.   All other systems reviewed and are negative.      Physical Exam   First Vitals:  BP: 125/89  Pulse: 106  Temp: 97.8  F (36.6  C)  Resp: 16  Height: 182.9 cm (6')  Weight: 85.3 kg (188 lb)  SpO2: 98 %    Physical Exam  Vital signs and nursing notes reviewed.     Constitutional: laying on gurney appears mildly uncomfortable  HENT: Oropharynx is clear and dry  Eyes: Conjunctivae are normal bilaterally. Pupils equal  Neck: normal range of motion  Cardiovascular: tachycardic rate, regular rhythm, normal heart sounds.   Pulmonary/Chest: Effort normal and breath sounds normal. No respiratory distress.   Abdominal: Soft. Bowel sounds are hypoactive. Vague discomfort throughout  abdomen, but most pronounced in RLQ.  Guarding to right lower abdomen, mild distention .   Musculoskeletal: No joint swelling or edema.   Neurological: Alert and oriented. No focal weakness  Skin: Skin is warm and dry. No rash noted.   Psych: normal affect      Emergency Department Course     Imaging:  Radiographic findings were communicated with the patient who voiced understanding of the findings.    CT Abdomen and Pelvis, w contrast, per radiology:   IMPRESSION:   1. There is a prominent appendicolith at the base of the appendix, and  the appendix appears thickened. Findings are suspicious for  appendicitis, possibly ruptured.  2. A thin fluid collection in the right lower quadrant along the  lateral aspect of the cecum and ascending colon is suspicious for  abscess related to a ruptured appendicitis.   3. There are multiple prominently dilated loops of mid and proximal  small bowel, with a zone of transition at a mildly thickened segment  of mid small bowel in the left midabdomen. Findings are suspicious for  a small-bowel obstruction. This appearance would be atypical for  ileus.  4. Diffuse fatty infiltration of the liver.  5. There is a trace amount of free fluid in the pelvis.    Laboratory:  CBC: WBC 7.3, HGB 16.2,   BMP: Na 125 (L), Potassium 3.1 (L), Chloride 84 (L), Glucose 120 (H), BUN 84 (H), GFR 24 (L), Creatinine 2.96 (H), o/w WNL  Lipase: 210  Hepatic Panel: Bilirubin 1.1 (H), Bilirubin 2.6 (H), Albumin 3.0 (L), AST 51 (H), o/w WNL  UA: Clear, yellow urine:, o/w WNL  Clostridium difficile PCR: Pending    Interventions:  1347: Normal Saline, 1000 mL, IV  1347: Zofran, 4 mg, IV injection   1549: Dilaudid, 0.5 mg, IV injection  : Invanz, 1 g, IV  : Zofran, 4 mg, IV injection    Emergency Department Course:  Nursing notes and vitals reviewed.  I performed an exam of the patient as documented above.  The above workup was undertaken.   I rechecked the patient and discussed results.  1534: I  discussed the patient with Radiologist.  1550: I discussed the patient with Dr. Granger of general surgery.  : I discussed the patient with Dr. Palacio of the hospitalist service.    Findings and plan explained to the Patient who consents to admission. Discussed the patient with Dr. Palacio, who will admit the patient to a medical bed for further monitoring, evaluation, and treatment.      Impression & Plan      Medical Decision Making:  Jaspreet Whaley is a 31 year old male with symptoms of fevers, chills about five days ago. Then developed abdominal pain, intermittent nausea and vomiting throughout the week, as well as diarrhea today. He was seen at outlying facility and appeared to be dehydrated, so they sent him here for evaluation.  On my exam, he had generalized abdominal tenderness that seemed most localized in RLQ. Therefore, labs were obtained, and plan was to do CT of abdomen with contrast. However, his creatinine came back elevated at 2.96. Therefore a non-contrast study was done. I reviewed this with the radiologist, who said it appeared this could be related to possible ruptured appendicitis with possible associated abscess, however, there were findings also reveal a possible obstructed etiology in the small intestine.. With patient's symptomatology and location of pain, I suspect probably appendicitis causing his symptoms, therefore Invanz was given.  IV fluid resuscitation was provided.  . I discussed case with Dr. Granger from General surgery for evaluation, he has a case in the OR and then will consult with the hospitalist.  I discussed the findings with the patient and he agrees with the plan.  I contacted Dr. Palacio who graciously agreed to accept the patient.     Diagnosis:    ICD-10-CM   1. Generalized abdominal pain R10.84   2. Acute renal insufficiency N28.9   3. Acute appendicitis with generalized peritonitis K35.2     Disposition:  Admit to the hospital pending surgical consultation.    I,  Edwardo Briscoe, am serving as a scribe on 4/28/2017 at 1:06 PM to personally document services performed by Lukas Stone MD, based on my observations and the provider's statements to me.    [facility] EMERGENCY DEPARTMENT       Lukas Stone MD  04/28/17 8822

## 2017-04-28 NOTE — PHARMACY-ADMISSION MEDICATION HISTORY
Admission medication history interview status for this patient is complete. See [facility] admission navigator for allergy information, prior to admission medications and immunization status.     Medication history interview source(s):Patient  Medication history resources (including written lists, pill bottles, clinic record):None  Primary pharmacy:n/a    Changes made to PTA medication list:  Added: none  Deleted: none  Changed: none    Actions taken by pharmacist (provider contacted, etc):None     Additional medication history information:None    Medication reconciliation/reorder completed by provider prior to medication history? No        For patients on insulin therapy: no  Lantus/levemir/NPH/Mix 70/30 dose:  _____   in AM/PM  or twice daily   Sliding scale Novolog Y/N  If Yes, do you have a baseline novolog pre-meal dose:  ______units with meals   Patients eat three meals a day:   Y/N     Any Barriers to therapy:  cost of medications/comfortable with giving injections (if applicable)/ comfortable and confident with current diabetes regimen       Prior to Admission medications    Not on File

## 2017-04-28 NOTE — ED NOTES
Pt complains of dehydration. States he went to [facility] and was told to come here for IV fluids.     States he started off with chills on Sunday, then progressed to n/v/d. Generalized abd pain and distention. 4 liquid stools yesterday. Last vomited yesterday.    Airway, breathing and circulation intact without need for intervention. Alert and interacting appropriately for age and situation.    HOME MEDICATIONS:  None

## 2017-04-29 PROBLEM — K37 APPENDICITIS: Status: ACTIVE | Noted: 2017-04-29

## 2017-04-29 LAB
ANION GAP SERPL CALCULATED.3IONS-SCNC: 10 MMOL/L (ref 3–14)
BASOPHILS # BLD AUTO: 0 10E9/L (ref 0–0.2)
BASOPHILS NFR BLD AUTO: 0 %
BUN SERPL-MCNC: 66 MG/DL (ref 7–30)
CALCIUM SERPL-MCNC: 7.4 MG/DL (ref 8.5–10.1)
CHLORIDE SERPL-SCNC: 96 MMOL/L (ref 94–109)
CO2 SERPL-SCNC: 24 MMOL/L (ref 20–32)
CREAT SERPL-MCNC: 2.34 MG/DL (ref 0.66–1.25)
DIFFERENTIAL METHOD BLD: ABNORMAL
EOSINOPHIL # BLD AUTO: 0 10E9/L (ref 0–0.7)
EOSINOPHIL NFR BLD AUTO: 0 %
ERYTHROCYTE [DISTWIDTH] IN BLOOD BY AUTOMATED COUNT: 12.1 % (ref 10–15)
GFR SERPL CREATININE-BSD FRML MDRD: 32 ML/MIN/1.7M2
GLUCOSE SERPL-MCNC: 142 MG/DL (ref 70–99)
GRAM STN SPEC: ABNORMAL
HCT VFR BLD AUTO: 41 % (ref 40–53)
HGB BLD-MCNC: 14.4 G/DL (ref 13.3–17.7)
LYMPHOCYTES # BLD AUTO: 0.6 10E9/L (ref 0.8–5.3)
LYMPHOCYTES NFR BLD AUTO: 11 %
Lab: ABNORMAL
MCH RBC QN AUTO: 33.1 PG (ref 26.5–33)
MCHC RBC AUTO-ENTMCNC: 35.1 G/DL (ref 31.5–36.5)
MCV RBC AUTO: 94 FL (ref 78–100)
METAMYELOCYTES # BLD: 1.8 10E9/L
METAMYELOCYTES NFR BLD MANUAL: 33 %
MICRO REPORT STATUS: ABNORMAL
MONOCYTES # BLD AUTO: 0.1 10E9/L (ref 0–1.3)
MONOCYTES NFR BLD AUTO: 2 %
MYELOCYTES # BLD: 1.2 10E9/L
MYELOCYTES NFR BLD MANUAL: 21 %
NEUTROPHILS # BLD AUTO: 1.8 10E9/L (ref 1.6–8.3)
NEUTROPHILS NFR BLD AUTO: 33 %
PLATELET # BLD AUTO: 167 10E9/L (ref 150–450)
PLATELET # BLD EST: NORMAL 10*3/UL
POTASSIUM SERPL-SCNC: 3.7 MMOL/L (ref 3.4–5.3)
RBC # BLD AUTO: 4.35 10E12/L (ref 4.4–5.9)
RBC MORPH BLD: ABNORMAL
SODIUM SERPL-SCNC: 130 MMOL/L (ref 133–144)
SPECIMEN SOURCE: ABNORMAL
WBC # BLD AUTO: 5.5 10E9/L (ref 4–11)

## 2017-04-29 PROCEDURE — 85025 COMPLETE CBC W/AUTO DIFF WBC: CPT | Performed by: SURGERY

## 2017-04-29 PROCEDURE — 80048 BASIC METABOLIC PNL TOTAL CA: CPT | Performed by: SURGERY

## 2017-04-29 PROCEDURE — S0028 INJECTION, FAMOTIDINE, 20 MG: HCPCS | Performed by: HOSPITALIST

## 2017-04-29 PROCEDURE — 25000125 ZZHC RX 250: Performed by: SURGERY

## 2017-04-29 PROCEDURE — 25000128 H RX IP 250 OP 636: Performed by: HOSPITALIST

## 2017-04-29 PROCEDURE — 25000128 H RX IP 250 OP 636: Performed by: NURSE ANESTHETIST, CERTIFIED REGISTERED

## 2017-04-29 PROCEDURE — 36415 COLL VENOUS BLD VENIPUNCTURE: CPT | Performed by: SURGERY

## 2017-04-29 PROCEDURE — 25000128 H RX IP 250 OP 636: Performed by: SURGERY

## 2017-04-29 PROCEDURE — 25000125 ZZHC RX 250: Performed by: HOSPITALIST

## 2017-04-29 PROCEDURE — 99233 SBSQ HOSP IP/OBS HIGH 50: CPT | Performed by: HOSPITALIST

## 2017-04-29 PROCEDURE — 12000007 ZZH R&B INTERMEDIATE

## 2017-04-29 PROCEDURE — 25000125 ZZHC RX 250: Performed by: NURSE ANESTHETIST, CERTIFIED REGISTERED

## 2017-04-29 RX ORDER — ACETAMINOPHEN 10 MG/ML
1000 INJECTION, SOLUTION INTRAVENOUS EVERY 8 HOURS
Status: DISCONTINUED | OUTPATIENT
Start: 2017-04-29 | End: 2017-05-02

## 2017-04-29 RX ORDER — SODIUM CHLORIDE, SODIUM LACTATE, POTASSIUM CHLORIDE, CALCIUM CHLORIDE 600; 310; 30; 20 MG/100ML; MG/100ML; MG/100ML; MG/100ML
INJECTION, SOLUTION INTRAVENOUS CONTINUOUS
Status: DISCONTINUED | OUTPATIENT
Start: 2017-04-29 | End: 2017-04-29 | Stop reason: HOSPADM

## 2017-04-29 RX ORDER — HYDROMORPHONE HYDROCHLORIDE 1 MG/ML
.3-.5 INJECTION, SOLUTION INTRAMUSCULAR; INTRAVENOUS; SUBCUTANEOUS
Status: DISCONTINUED | OUTPATIENT
Start: 2017-04-29 | End: 2017-04-29

## 2017-04-29 RX ORDER — ONDANSETRON 2 MG/ML
4 INJECTION INTRAMUSCULAR; INTRAVENOUS EVERY 30 MIN PRN
Status: DISCONTINUED | OUTPATIENT
Start: 2017-04-29 | End: 2017-04-29 | Stop reason: HOSPADM

## 2017-04-29 RX ORDER — ACETAMINOPHEN 325 MG/1
650 TABLET ORAL EVERY 4 HOURS PRN
Status: DISCONTINUED | OUTPATIENT
Start: 2017-04-29 | End: 2017-05-06 | Stop reason: HOSPADM

## 2017-04-29 RX ORDER — ONDANSETRON 4 MG/1
4 TABLET, ORALLY DISINTEGRATING ORAL EVERY 6 HOURS PRN
Status: DISCONTINUED | OUTPATIENT
Start: 2017-04-29 | End: 2017-04-29

## 2017-04-29 RX ORDER — LABETALOL HYDROCHLORIDE 5 MG/ML
10 INJECTION, SOLUTION INTRAVENOUS
Status: DISCONTINUED | OUTPATIENT
Start: 2017-04-29 | End: 2017-04-29 | Stop reason: HOSPADM

## 2017-04-29 RX ORDER — PROCHLORPERAZINE MALEATE 5 MG
5-10 TABLET ORAL EVERY 6 HOURS PRN
Status: DISCONTINUED | OUTPATIENT
Start: 2017-04-29 | End: 2017-05-06 | Stop reason: HOSPADM

## 2017-04-29 RX ORDER — ONDANSETRON 4 MG/1
4 TABLET, ORALLY DISINTEGRATING ORAL EVERY 30 MIN PRN
Status: DISCONTINUED | OUTPATIENT
Start: 2017-04-29 | End: 2017-04-29 | Stop reason: HOSPADM

## 2017-04-29 RX ORDER — PROCHLORPERAZINE MALEATE 5 MG
5-10 TABLET ORAL EVERY 6 HOURS PRN
Status: DISCONTINUED | OUTPATIENT
Start: 2017-04-29 | End: 2017-04-29

## 2017-04-29 RX ORDER — FENTANYL CITRATE 50 UG/ML
25-50 INJECTION, SOLUTION INTRAMUSCULAR; INTRAVENOUS
Status: DISCONTINUED | OUTPATIENT
Start: 2017-04-29 | End: 2017-04-29 | Stop reason: HOSPADM

## 2017-04-29 RX ORDER — ONDANSETRON 2 MG/ML
4 INJECTION INTRAMUSCULAR; INTRAVENOUS EVERY 6 HOURS PRN
Status: DISCONTINUED | OUTPATIENT
Start: 2017-04-29 | End: 2017-04-29

## 2017-04-29 RX ORDER — LIDOCAINE 40 MG/G
CREAM TOPICAL
Status: DISCONTINUED | OUTPATIENT
Start: 2017-04-29 | End: 2017-05-06 | Stop reason: HOSPADM

## 2017-04-29 RX ORDER — NALOXONE HYDROCHLORIDE 0.4 MG/ML
.1-.4 INJECTION, SOLUTION INTRAMUSCULAR; INTRAVENOUS; SUBCUTANEOUS
Status: DISCONTINUED | OUTPATIENT
Start: 2017-04-29 | End: 2017-05-06 | Stop reason: HOSPADM

## 2017-04-29 RX ORDER — HYDROMORPHONE HYDROCHLORIDE 1 MG/ML
.3-.5 INJECTION, SOLUTION INTRAMUSCULAR; INTRAVENOUS; SUBCUTANEOUS EVERY 5 MIN PRN
Status: DISCONTINUED | OUTPATIENT
Start: 2017-04-29 | End: 2017-04-29 | Stop reason: HOSPADM

## 2017-04-29 RX ORDER — HEPARIN SODIUM 5000 [USP'U]/.5ML
5000 INJECTION, SOLUTION INTRAVENOUS; SUBCUTANEOUS EVERY 8 HOURS
Status: DISCONTINUED | OUTPATIENT
Start: 2017-04-29 | End: 2017-05-06 | Stop reason: HOSPADM

## 2017-04-29 RX ORDER — SODIUM CHLORIDE 9 MG/ML
INJECTION, SOLUTION INTRAVENOUS CONTINUOUS
Status: DISCONTINUED | OUTPATIENT
Start: 2017-04-29 | End: 2017-05-06 | Stop reason: HOSPADM

## 2017-04-29 RX ORDER — ONDANSETRON 4 MG/1
4 TABLET, ORALLY DISINTEGRATING ORAL EVERY 6 HOURS PRN
Status: DISCONTINUED | OUTPATIENT
Start: 2017-04-29 | End: 2017-05-06 | Stop reason: HOSPADM

## 2017-04-29 RX ORDER — NALOXONE HYDROCHLORIDE 0.4 MG/ML
.1-.4 INJECTION, SOLUTION INTRAMUSCULAR; INTRAVENOUS; SUBCUTANEOUS
Status: DISCONTINUED | OUTPATIENT
Start: 2017-04-29 | End: 2017-04-29

## 2017-04-29 RX ORDER — ONDANSETRON 2 MG/ML
4 INJECTION INTRAMUSCULAR; INTRAVENOUS EVERY 6 HOURS PRN
Status: DISCONTINUED | OUTPATIENT
Start: 2017-04-29 | End: 2017-05-06 | Stop reason: HOSPADM

## 2017-04-29 RX ORDER — OXYCODONE HYDROCHLORIDE 5 MG/1
5-10 TABLET ORAL
Status: DISCONTINUED | OUTPATIENT
Start: 2017-04-29 | End: 2017-04-29

## 2017-04-29 RX ORDER — PROCHLORPERAZINE 25 MG
25 SUPPOSITORY, RECTAL RECTAL EVERY 12 HOURS PRN
Status: DISCONTINUED | OUTPATIENT
Start: 2017-04-29 | End: 2017-04-29

## 2017-04-29 RX ORDER — SODIUM CHLORIDE 9 MG/ML
INJECTION, SOLUTION INTRAVENOUS CONTINUOUS
Status: DISCONTINUED | OUTPATIENT
Start: 2017-04-29 | End: 2017-04-29

## 2017-04-29 RX ADMIN — ACETAMINOPHEN 1000 MG: 10 INJECTION, SOLUTION INTRAVENOUS at 09:45

## 2017-04-29 RX ADMIN — FAMOTIDINE 20 MG: 10 INJECTION, SOLUTION INTRAVENOUS at 02:14

## 2017-04-29 RX ADMIN — SODIUM CHLORIDE: 9 INJECTION, SOLUTION INTRAVENOUS at 02:01

## 2017-04-29 RX ADMIN — ACETAMINOPHEN 1000 MG: 10 INJECTION, SOLUTION INTRAVENOUS at 23:53

## 2017-04-29 RX ADMIN — Medication: at 00:47

## 2017-04-29 RX ADMIN — TAZOBACTAM SODIUM AND PIPERACILLIN SODIUM 3.38 G: 375; 3 INJECTION, SOLUTION INTRAVENOUS at 02:53

## 2017-04-29 RX ADMIN — SODIUM CHLORIDE: 9 INJECTION, SOLUTION INTRAVENOUS at 20:06

## 2017-04-29 RX ADMIN — Medication: at 21:15

## 2017-04-29 RX ADMIN — Medication 1 MG: at 00:18

## 2017-04-29 RX ADMIN — TAZOBACTAM SODIUM AND PIPERACILLIN SODIUM 3.38 G: 375; 3 INJECTION, SOLUTION INTRAVENOUS at 20:07

## 2017-04-29 RX ADMIN — TAZOBACTAM SODIUM AND PIPERACILLIN SODIUM 3.38 G: 375; 3 INJECTION, SOLUTION INTRAVENOUS at 08:53

## 2017-04-29 RX ADMIN — HEPARIN SODIUM 5000 UNITS: 10000 INJECTION, SOLUTION INTRAVENOUS; SUBCUTANEOUS at 18:05

## 2017-04-29 RX ADMIN — ACETAMINOPHEN 1000 MG: 10 INJECTION, SOLUTION INTRAVENOUS at 17:12

## 2017-04-29 RX ADMIN — TAZOBACTAM SODIUM AND PIPERACILLIN SODIUM 3.38 G: 375; 3 INJECTION, SOLUTION INTRAVENOUS at 13:43

## 2017-04-29 RX ADMIN — ACETAMINOPHEN 1000 MG: 10 INJECTION, SOLUTION INTRAVENOUS at 02:14

## 2017-04-29 RX ADMIN — GLYCOPYRROLATE 0.2 MG: 0.2 INJECTION, SOLUTION INTRAMUSCULAR; INTRAVENOUS at 00:18

## 2017-04-29 RX ADMIN — Medication: at 13:44

## 2017-04-29 ASSESSMENT — ACTIVITIES OF DAILY LIVING (ADL)
TOILETING: 0-->INDEPENDENT
TRANSFERRING: 2-->ASSISTIVE PERSON
DRESS: 0-->INDEPENDENT
RETIRED_COMMUNICATION: 0-->UNDERSTANDS/COMMUNICATES WITHOUT DIFFICULTY
COMMUNICATION: 0-->UNDERSTANDS/COMMUNICATES WITHOUT DIFFICULTY
BATHING: 2-->ASSISTIVE PERSON
AMBULATION: 2-->ASSISTIVE PERSON
TOILETING: 2-->ASSISTIVE PERSON
TRANSFERRING: 0-->INDEPENDENT
EATING: 0-->INDEPENDENT
DRESS: 2-->ASSISTIVE PERSON
SWALLOWING: 0-->SWALLOWS FOODS/LIQUIDS WITHOUT DIFFICULTY
SWALLOWING: 0-->SWALLOWS FOODS/LIQUIDS WITHOUT DIFFICULTY
RETIRED_EATING: 0-->INDEPENDENT
FALL_HISTORY_WITHIN_LAST_SIX_MONTHS: NO
COGNITION: 0 - NO COGNITION ISSUES REPORTED
AMBULATION: 0-->INDEPENDENT
BATHING: 0-->INDEPENDENT

## 2017-04-29 NOTE — PLAN OF CARE
Problem: Goal Outcome Summary  Goal: Goal Outcome Summary  Outcome: Improving  AO, VSS-2LNC. Dressing old dry drainaged outline. Pain managed by ice and PCA.German closed to suction and striped. NG low intermittent suction, pavon patent adequate. Dangled pt at bedside and took few steps to top and tolerated well.

## 2017-04-29 NOTE — PROGRESS NOTES
[facility]    Hospitalist Progress Note  Name: Jaspreet Whaley    MRN: [id number]  Provider:  Johnathon Palacio DO, MPH  Date of Service: 04/29/2017    Summary of Stay: Jaspreet Whaley is a 31 year old male with no medical hx admitted on 4/28/2017 with acute appendicitis requiring laparoscopy converted to laparotomy appendectomy. Taken urgently to surgery last night. Found to have acute perforated appendicitis with multiple abscesses, fecaliths, SBO, and peritonitis. Intra op Cx pending but several organisms on gram stain. Currently NPO, on IVF with NGT, dilaudid PCA and LAMONTE drain. In ED, also found to have prerenal RAMAN and hyponatremia to 125, both improving with IVF. Was given ertapenem x1 in ED, now on IV zosyn.    Problem List:   1. Acute perforated appendicitis with multiple abscesses, fecaliths, SBO, and peritonitis s/p open laparotomy with appendectomy 4/28: Appreciate general surgery management. Appears to be doing well today. Intra op Cx pending but several organisms on gram stain. Currently NPO, on IVF with NGT, dilaudid PCA and LAMONTE drain. On zosyn.    2. Prerenal RAMAN: His abdominal sx had been ongoing several days with vomiting and limited PO. Creatinine on presentation was 3.0, now down to 2.3 with NS. Continue IVF today, avoid nephrotoxins. Monitor UOP, appears adequate thus far.    3. Hypovolemic hyponatremia: Again, volume depleted on presentation. Na 125 on admission, appropriate rate of rise to 130 today. Continue NS. BMP in AM.    DVT Prophylaxis: Heparin SQ  Code Status: Full Code  Disposition: Expected discharge in 3-4 days to home. Goals prior to discharge include recover from large surgical intervention.   Family updated today: Yes      Interval History   Pt doing well. No chest pain or shortness of breath. No nausea, vomiting, diarrhea, constipation. Abdominal pain controlled on PCA. No fevers. No other complaints identified.     Last night taken to OR for laparoscopy converted to  laparotomy appendectomy. Found to have acute perforated appendicitis with multiple abscesses, fecaliths, SBO, and peritonitis. Creatinine improving to 2.3, good UOP. Na up to 130.    -Data reviewed today: I reviewed all new labs and imaging results over the last 24 hours.     Physical Exam   Temp: 98.6  F (37  C) Temp src: Oral BP: 127/79 Pulse: 100 Heart Rate: 91 Resp: 16 SpO2: 98 % O2 Device: Nasal cannula Oxygen Delivery: 2 LPM  Vitals:    04/28/17 1300   Weight: 85.3 kg (188 lb)     Vital Signs with Ranges  Temp:  [97.2  F (36.2  C)-98.6  F (37  C)] 98.6  F (37  C)  Pulse:  [] 100  Heart Rate:  [] 91  Resp:  [10-22] 16  BP: (116-188)/() 127/79  FiO2 (%):  [100 %] 100 %  SpO2:  [93 %-100 %] 98 %  I/O last 3 completed shifts:  In: 3273 [I.V.:3273]  Out: 1775 [Urine:875; Emesis/NG output:200; Drains:50; Other:650]    GENERAL: No apparent distress. Awake, alert, and fully oriented.  HEENT: Normocephalic, atraumatic. Extraocular movements intact.  CARDIOVASCULAR: Regular rate and rhythm without murmurs or rubs. No S3.  PULMONARY: Clear bilaterally.  GASTROINTESTINAL: Soft, non-tender, non-distended. Bowel sounds normoactive.   EXTREMITIES: No cyanosis or clubbing. No edema.  NEUROLOGICAL: CN 2-12 grossly intact, no focal neurological deficits.  DERMATOLOGICAL: No rash, ulcer, bruising, nor jaundice.     Medications     NaCl 125 mL/hr at 04/29/17 0201       famotidine  20 mg Intravenous Q24H     sodium chloride (PF)  3 mL Intracatheter Q8H     HYDROmorphone   Intravenous PCA     piperacillin-tazobactam  3.375 g Intravenous Q6H     acetaminophen  1,000 mg Intravenous Q8H     heparin  5,000 Units Subcutaneous Q8H     Data     Laboratory:    Recent Labs  Lab 04/29/17  0616 04/28/17  1345   WBC 5.5 7.3   HGB 14.4 16.2   HCT 41.0 44.4   MCV 94 91    158       Recent Labs  Lab 04/29/17  0616 04/28/17  1345   * 125*   POTASSIUM 3.7 3.1*   CHLORIDE 96 84*   CO2 24 29   ANIONGAP 10 12   *  120*   BUN 66* 84*   CR 2.34* 2.96*   GFRESTIMATED 32* 24*   GFRESTBLACK 39* 30*   JEAN MARIE 7.4* 8.9       Recent Labs  Lab 04/28/17  1345   AST 51*   ALT 62   ALKPHOS 70   BILITOTAL 2.6*       Recent Labs  Lab 04/28/17  1627   LACT 1.1       Recent Labs  Lab 04/28/17  1345   LIPASE 210       Recent Labs  Lab 04/28/17  2139   CULT Culture negative monitoring continues       Imaging:  Recent Results (from the past 24 hour(s))   Abd/pelvis CT no contrast - Stone Protocol    Narrative    CT ABDOMEN AND PELVIS WITHOUT CONTRAST   4/28/2017 3:10 PM     HISTORY: Abdominal pain. Nausea and vomiting. Diarrhea.    TECHNIQUE: Volumetric helical sections were acquired from the lung  bases through the ischial tuberosities without IV contrast. Coronal  images were also reconstructed. Radiation dose for this scan was  reduced using automated exposure control, adjustment of the mA and/or  kV according to patient size, or iterative reconstruction technique.    COMPARISON: None.    FINDINGS:  There are multiple prominently dilated loops of mid and  proximal small bowel, suspicious for a small-bowel obstruction. There  is a probable zone of transition involving a mildly thickened loop of  mid small bowel in the left midabdomen (series 3 images 72-83). There  are multiple loops of decompressed distal small bowel, and the colon  appears decompressed.    There is a prominent appendicolith at the base of the appendix,  measuring approximately 1.6 cm (series 2 image 73). The appendix is  not thickened, measuring up to approximately 1.4 cm. There is mild  periappendiceal fat stranding. Findings are suspicious for  appendicitis, possibly ruptured could have this appearance. There is a  thin fluid collection in the right lower quadrant lateral to the cecum  and ascending colon, measuring approximately 9.1 x 3.2 x 2.4 cm  (series 3 image 60). A few air bubbles are seen within this fluid  collection, and this collection is suspicious for abscess. A  second  smaller fluid collection is noted in the right lower quadrant  anteriorly (series 2 image 69). There is a trace amount of nonspecific  free fluid in the pelvis. No free intraperitoneal air.    The gallbladder appears contracted. There is diffuse fatty  infiltration of the liver. The spleen, adrenal glands, pancreas, and  kidneys have unremarkable noncontrast appearances. Trace amount of  pericardial fluid is noted. The visualized lung bases are clear.      Impression    IMPRESSION:   1. There is a prominent appendicolith at the base of the appendix, and  the appendix appears thickened. Findings are suspicious for  appendicitis, possibly ruptured.  2. A thin fluid collection in the right lower quadrant along the  lateral aspect of the cecum and ascending colon is suspicious for  abscess related to a ruptured appendicitis.   3. There are multiple prominently dilated loops of mid and proximal  small bowel, with a zone of transition at a mildly thickened segment  of mid small bowel in the left midabdomen. Findings are suspicious for  a small-bowel obstruction. This appearance would be atypical for  ileus.  4. Diffuse fatty infiltration of the liver.  5. There is a trace amount of free fluid in the pelvis.    I discussed these findings with Dr. Bertha harrington at the time of  this dictation.                 Johnathon Palacio DO MPH  [facility] Hospitalist  [address] [place] [address]  Pager: [phone]  04/29/2017

## 2017-04-29 NOTE — ANESTHESIA PREPROCEDURE EVALUATION
PAC NOTE:       ANESTHESIA PRE EVALUATION:  Anesthesia Evaluation     .             ROS/MED HX    ENT/Pulmonary:  - neg pulmonary ROS     Neurologic:  - neg neurologic ROS     Cardiovascular:  - neg cardiovascular ROS       METS/Exercise Tolerance:     Hematologic: Comments: Lab Test        04/28/17     02/22/10                       1345          1815          WBC          7.3          6.8           HGB          16.2         16.7          MCV          91           90            PLT          158          149*           Lab Test        04/28/17     02/22/10                       1345          1815          NA           125*         140           POTASSIUM    3.1*         3.8           CHLORIDE     84*          100           CO2          29           30            BUN          84*          16            CR           2.96*        1.10          ANIONGAP     12           10            JEAN MARIE          8.9          9.6           GLC          120*         99                    Musculoskeletal:  - neg musculoskeletal ROS       GI/Hepatic:     (+) appendicitis,       Renal/Genitourinary:  - ROS Renal section negative       Endo:  - neg endo ROS       Psychiatric:  - neg psychiatric ROS       Infectious Disease:  - neg infectious disease ROS       Malignancy:         Other:    - neg other ROS                 Physical Exam  Normal systems: cardiovascular, pulmonary and dental    Airway   Mallampati: II  TM distance: >3 FB  Neck ROM: full    Dental     Cardiovascular       Pulmonary              Anesthesia Plan      History & Physical Review  History and physical reviewed and following examination; no interval change.    ASA Status:  1 emergent.    NPO Status:  > 8 hours    Plan for General, ETT and RSI with Intravenous induction. Maintenance will be Balanced.    PONV prophylaxis:  Ondansetron (or other 5HT-3) and Dexamethasone or Solumedrol       Postoperative Care  Postoperative pain management:  IV analgesics and Oral pain  medications.      Consents  Anesthetic plan, risks, benefits and alternatives discussed with:  Patient.  Use of blood products discussed: Yes.   Use of blood products discussed with Patient.  Consented to blood products.  .                            .

## 2017-04-29 NOTE — ANESTHESIA POSTPROCEDURE EVALUATION
Patient: Jaspreet Whaley    Procedure(s):  Laparoscopy converted to laparotomy, appendectomy - Wound Class: IV-Dirty or Infected    Diagnosis:perf bowel  Diagnosis Additional Information: Pre-operative diagnosis: perf bowel  Post-operative diagnosis Acute appendicitis with peritonitis   Procedure: Procedure(s):  Laparoscopy converted to laparotomy, appendectomy - Wound Class: IV-Dirty or Infected        Anesthesia Type:  General, ETT, RSI    Note:  Anesthesia Post Evaluation    Patient location during evaluation: PACU  Patient participation: Able to fully participate in evaluation  Level of consciousness: awake and alert  Pain management: adequate  Airway patency: patent  Cardiovascular status: acceptable  Respiratory status: acceptable  Hydration status: acceptable  PONV: none     Anesthetic complications: None          Last vitals:  Vitals:    04/29/17 0339 04/29/17 0414 04/29/17 0505   BP: 120/67 116/76 123/78   Pulse: 99 88 98   Resp: 14 13    Temp: 97.2  F (36.2  C)     SpO2:  98%          Electronically Signed By: Lisandro Aldridge MD  April 29, 2017  6:17 AM

## 2017-04-29 NOTE — ANESTHESIA CARE TRANSFER NOTE
Patient: Jaspreet Whaley    Procedure(s):  Laparoscopy converted to laparotomy, appendectomy - Wound Class: IV-Dirty or Infected    Diagnosis: perf bowel  Diagnosis Additional Information: No value filed.    Anesthesia Type:   General, ETT, RSI     Note:  Airway :ETT  Patient transferred to:PACU  Comments: Transferred to recovery, spontaneous respirations, adequate ventilation/oxyenation during transfer, report shared.      Vitals: (Last set prior to Anesthesia Care Transfer)    CRNA VITALS  4/28/2017 2333 - 4/29/2017 0017      4/29/2017             Pulse: 88    SpO2: 100 %    Resp Rate (observed): 12                Electronically Signed By: FABIAN Conde CRNA  April 29, 2017  12:17 AM

## 2017-04-29 NOTE — ED NOTES
VSS.  Patient rates his pain a 4-5 on a 1-10 scale.  Patient and family members kept updated regarding bed availability.    Telephone report to receiving RN on 6th Floor.  All questions answered.

## 2017-04-29 NOTE — BRIEF OP NOTE
[facility] Brief Operative Note    Pre-operative diagnosis: perf bowel   Post-operative diagnosis Acute appendicitis with peritonitis     Procedure: Procedure(s):  Laparoscopy converted to laparotomy, appendectomy - Wound Class: IV-Dirty or Infected   Surgeon(s): Surgeon(s) and Role:     * Jean Granger MD - Primary     * Taylor Chino PA-C   Estimated blood loss: 150    Specimens:   ID Type Source Tests Collected by Time Destination   1 : abdominal abscess Abscess Abdomen ABSCESS CULTURE AEROBIC BACTERIAL, ANAEROBIC BACTERIAL CULTURE, GRAM STAIN Jean Granger MD 4/28/2017  9:39 PM    A : appendix Tissue Appendix SURGICAL PATHOLOGY EXAM Jean Granger MD 4/28/2017 10:12 PM       Findings: Perforated acute appendicitis with multiple abscesses and fecaliths. Small bowel obstruction. Base of appendix appeared viable and appendix was removed with stapler. Converted to open to run bowel. Serosal tear on antimesenteric portion of dilated bowel repaired with multiple interrupted silk sutures. Bleeding from small bowel mesentery in course of exteriorizing bowel controlled with sutures and nuknit. Drain left over raw area of mesentery.

## 2017-04-29 NOTE — PROGRESS NOTES
[facility]   General Surgery Progress Note           Assessment and Plan:   Assessment:   POD#1s/p Procedure(s):  Laparoscopy converted to laparotomy, appendectomy - Wound Class: IV-Dirty or Infected   Lysis of adhesions.  For perforated appendicitis and sbo.  Ileus as expected.      Plan:   Continue npo, IVF, NG tube.  oob and ambulate.  Pepcid for stress ulcer prophylaxis.  Subcutaneous heparin for dvt prophylaxis.  Antibiotics: Zosyn         Interval History:   No flatus or BM.  Pain controlled c PCA.         Physical Exam:   Blood pressure 127/79, pulse 100, temperature 98.6  F (37  C), temperature source Oral, resp. rate 16, height 1.829 m (6'), weight 85.3 kg (188 lb), SpO2 98 %.    I/O last 3 completed shifts:  In: 3273 [I.V.:3273]  Out: 1775 [Urine:875; Emesis/NG output:200; Drains:50; Other:650]    Abdomen: soft, ND, NT  Inc(s) -  Widely spaced staples with packing strips between.  Without erythema.  Without subcutaneous fluid or masses. LAMONTE serosang.            Data:     Recent Labs   Lab Test  04/29/17   0616  04/28/17   1345  02/22/10   1815   HGB  14.4  16.2  16.7   WBC  5.5  7.3  6.8       Lesley Dodson MD

## 2017-04-29 NOTE — CONSULTS
[facility]     Hospital Consultation     Jaspreet Whaley MRN# [id number]   YOB: 1985 Age: 31 year old     Primary care provider: None    ASSESSMENT:   Jaspreet Whaley is an 31 year old year old male who I was asked to see by Dr. Palacio for intra-abdominal problem.    Consistent with small bowel obstruction from perforated appendicitis with abscess. Acute renal failure.    RECOMMENDATIONS:   I had a long discussion with the patient and his family. I also discussed this with one of my partners. Lower expectation that this will resolve spontaneously with antibiotics and I think it is most likely that he will eventually need surgery to correct small bowel obstruction. Certainly the appendix will need to be removed as well if this was the root of the problem and this may require a partial bowel resection. I discussed the risks, benefits, and alternatives to this with the patient and the patient's family at length. They elected to proceed with exploratory laparoscopy with possible laparotomy and possible bowel resection. We will proceed tonight.    Jean Granger MD  [phone] (p)  Click here to send text page.     This note was created using voice recognition software. Undetected word substitutions or other errors may have occurred.      HPI:    Jaspreet Whaley is a 31 year old male who I was asked to see for intra-abdominal problem. The patient has had nausea, vomiting, diarrhea all week. There was bloating. There was some lower abdominal pain. There were hot and cold sensations as well as diaphoresis but he did not ever take his temperature. The abdominal pain is mild only and does not radiate. Pushing makes it a little worse. Walking hurt a little bit. He has not been able to eat or drink anything for several days. He went to the ER where the emergency room doctor examined the patient and ordered testing. He was actually able to determine that the patient had  intra-abdominal inflammation as well as signs of small bowel obstruction. There was inflammation in the right lower quadrant which may suggest perforated appendicitis as the cause.           PAST MEDICAL HISTORY:   History reviewed. No pertinent past medical history.     PAST SURGICAL HISTORY:   History reviewed. No pertinent surgical history.    SOCIAL HISTORY:     Social History     Social History     Marital status: Single     Spouse name: N/A     Number of children: N/A     Years of education: N/A     Social History Main Topics     Smoking status: Never Smoker     Smokeless tobacco: None     Alcohol use 14.4 oz/week     24 Cans of beer per week     Drug use: No     Sexual activity: Not Asked     Other Topics Concern     None     Social History Narrative     None        FAMILY HISTORY:   No family history on file.    MEDICATIONS:     No current outpatient prescriptions on file.        ALLERGIES:   No Known Allergies     REVIEW OF SYSTEMS:   10 point ROS neg other than the symptoms noted above in the HPI or here.      PHYSICAL EXAM:   /82  Pulse 95  Temp 97.8  F (36.6  C) (Oral)  Resp 16  Ht 1.829 m (6')  Wt 85.3 kg (188 lb)  SpO2 97%  BMI 25.5 kg/m2 Estimated body mass index is 25.5 kg/(m^2) as calculated from the following:    Height as of this encounter: 1.829 m (6').    Weight as of this encounter: 85.3 kg (188 lb).   Constitutional: No acute distress  Eyes: Sclerae anicteric, moist conjunctivae; no lid-lag; PERRLA  ENT: Atraumatic; oropharynx clear with moist mucous membranes and no mucosal ulcerations; normal hard and soft palate  Neck: Supple neck without lymphadenopathy, no thyromegaly  Respiratory: Clear to auscultation bilaterally with normal respiratory effort  Cardiovascular: Regular rate and rhythm, no MRGs  GI: Mildly distended but soft. Mild lower abdominal tenderness; no masses or HSM  Lymph/Hematologic: No pretibial edema  Genitourinary: Deferred  Skin: Normal temperature, turgor and  texture; no rash, ulcers or subcutaneous nodules  Musculoskeletal: Grossly symmetric and normal muscle bulk for age in all extremities; gait and station not examined; no clubbing or cyanosis  Neurologic: CN II-XII grossly intact without deficits; sensation intact to light touch over all extremities  Neuropsychiatric: Appropriate affect, alert and oriented to person, place and time     LABORATORY AND IMAGING:      Results for orders placed or performed during the hospital encounter of 04/28/17   Abd/pelvis CT no contrast - [facility] Protocol    Narrative    CT ABDOMEN AND PELVIS WITHOUT CONTRAST   4/28/2017 3:10 PM     HISTORY: Abdominal pain. Nausea and vomiting. Diarrhea.    TECHNIQUE: Volumetric helical sections were acquired from the lung  bases through the ischial tuberosities without IV contrast. Coronal  images were also reconstructed. Radiation dose for this scan was  reduced using automated exposure control, adjustment of the mA and/or  kV according to patient size, or iterative reconstruction technique.    COMPARISON: None.    FINDINGS:  There are multiple prominently dilated loops of mid and  proximal small bowel, suspicious for a small-bowel obstruction. There  is a probable zone of transition involving a mildly thickened loop of  mid small bowel in the left midabdomen (series 3 images 72-83). There  are multiple loops of decompressed distal small bowel, and the colon  appears decompressed.    There is a prominent appendicolith at the base of the appendix,  measuring approximately 1.6 cm (series 2 image 73). The appendix is  not thickened, measuring up to approximately 1.4 cm. There is mild  periappendiceal fat stranding. Findings are suspicious for  appendicitis, possibly ruptured could have this appearance. There is a  thin fluid collection in the right lower quadrant lateral to the cecum  and ascending colon, measuring approximately 9.1 x 3.2 x 2.4 cm  (series 3 image 60). A few air bubbles are seen within  this fluid  collection, and this collection is suspicious for abscess. A second  smaller fluid collection is noted in the right lower quadrant  anteriorly (series 2 image 69). There is a trace amount of nonspecific  free fluid in the pelvis. No free intraperitoneal air.    The gallbladder appears contracted. There is diffuse fatty  infiltration of the liver. The spleen, adrenal glands, pancreas, and  kidneys have unremarkable noncontrast appearances. Trace amount of  pericardial fluid is noted. The visualized lung bases are clear.      Impression    IMPRESSION:   1. There is a prominent appendicolith at the base of the appendix, and  the appendix appears thickened. Findings are suspicious for  appendicitis, possibly ruptured.  2. A thin fluid collection in the right lower quadrant along the  lateral aspect of the cecum and ascending colon is suspicious for  abscess related to a ruptured appendicitis.   3. There are multiple prominently dilated loops of mid and proximal  small bowel, with a zone of transition at a mildly thickened segment  of mid small bowel in the left midabdomen. Findings are suspicious for  a small-bowel obstruction. This appearance would be atypical for  ileus.  4. Diffuse fatty infiltration of the liver.  5. There is a trace amount of free fluid in the pelvis.    I discussed these findings with Dr. Bertha harrington at the time of  this dictation.           CBC + differential   Result Value Ref Range    WBC 7.3 4.0 - 11.0 10e9/L    RBC Count 4.87 4.4 - 5.9 10e12/L    Hemoglobin 16.2 13.3 - 17.7 g/dL    Hematocrit 44.4 40.0 - 53.0 %    MCV 91 78 - 100 fl    MCH 33.3 (H) 26.5 - 33.0 pg    MCHC 36.5 31.5 - 36.5 g/dL    RDW 11.7 10.0 - 15.0 %    Platelet Count 158 150 - 450 10e9/L    Diff Method Automated Method     % Neutrophils 79.6 %    % Lymphocytes 8.6 %    % Monocytes 10.8 %    % Eosinophils 0.0 %    % Basophils 0.3 %    % Immature Granulocytes 0.7 %    Nucleated RBCs 0 0 /100    Absolute  Neutrophil 5.8 1.6 - 8.3 10e9/L    Absolute Lymphocytes 0.6 (L) 0.8 - 5.3 10e9/L    Absolute Monocytes 0.8 0.0 - 1.3 10e9/L    Absolute Eosinophils 0.0 0.0 - 0.7 10e9/L    Absolute Basophils 0.0 0.0 - 0.2 10e9/L    Abs Immature Granulocytes 0.1 0 - 0.4 10e9/L    Absolute Nucleated RBC 0.0     Reactive Lymphs Present     RBC Morphology Consistent with reported results     Platelet Estimate Normal    Basic metabolic panel (BMP)   Result Value Ref Range    Sodium 125 (L) 133 - 144 mmol/L    Potassium 3.1 (L) 3.4 - 5.3 mmol/L    Chloride 84 (L) 94 - 109 mmol/L    Carbon Dioxide 29 20 - 32 mmol/L    Anion Gap 12 3 - 14 mmol/L    Glucose 120 (H) 70 - 99 mg/dL    Urea Nitrogen 84 (H) 7 - 30 mg/dL    Creatinine 2.96 (H) 0.66 - 1.25 mg/dL    GFR Estimate 24 (L) >60 mL/min/1.7m2    GFR Estimate If Black 30 (L) >60 mL/min/1.7m2    Calcium 8.9 8.5 - 10.1 mg/dL   Hepatic panel   Result Value Ref Range    Bilirubin Direct 1.1 (H) 0.0 - 0.2 mg/dL    Bilirubin Total 2.6 (H) 0.2 - 1.3 mg/dL    Albumin 3.0 (L) 3.4 - 5.0 g/dL    Protein Total 8.4 6.8 - 8.8 g/dL    Alkaline Phosphatase 70 40 - 150 U/L    ALT 62 0 - 70 U/L    AST 51 (H) 0 - 45 U/L   Lipase   Result Value Ref Range    Lipase 210 73 - 393 U/L   UA with Microscopic   Result Value Ref Range    Color Urine Yellow     Appearance Urine Cloudy     Glucose Urine Negative NEG mg/dL    Bilirubin Urine Negative NEG    Ketones Urine 5 (A) NEG mg/dL    Specific Gravity Urine 1.015 1.003 - 1.035    Blood Urine Small (A) NEG    pH Urine 5.0 5.0 - 7.0 pH    Protein Albumin Urine 30 (A) NEG mg/dL    Urobilinogen mg/dL 2.0 0.0 - 2.0 mg/dL    Nitrite Urine Negative NEG    Leukocyte Esterase Urine Negative NEG    Source Midstream Urine     WBC Urine 7 (H) 0 - 2 /HPF    RBC Urine 3 (H) 0 - 2 /HPF    Bacteria Urine Few (A) NEG /HPF    Squamous Epithelial /HPF Urine 1 0 - 1 /HPF    Mucous Urine Present (A) NEG /LPF    Hyaline Casts 2 0 - 2 /LPF   Fractional excretion of sodium   Result Value  Ref Range    Creatinine Urine 112 mg/dL    Sodium Urine mmol/L 14 mmol/L    %FENA 0.3 %   Lactic acid   Result Value Ref Range    Lactic Acid 1.1 0.4 - 2.0 mmol/L     I personally reviewed and interpreted the CT scan. There is right lower quadrant inflammation as well as inflammation throughout the rest of the lower abdomen and multiple fluid collections. There is an appendicolith.    50 minutes were spent in this encounter, over 50% in counseling and coordination of care.

## 2017-04-29 NOTE — PLAN OF CARE
Problem: Goal Outcome Summary  Goal: Goal Outcome Summary  Outcome: Improving  NG tube in place, pt c/o of abdominal pain, being managed with PCA. Pt NPO. Pt dressing marked.was changed by MD or PA earlier in shift. Frank removed. LAMONTE draining, stripped q4h. CMS intact.VSS. Plan per MD is for pt to stay most of the week. Will continue to monitor.

## 2017-04-30 LAB
ALBUMIN SERPL-MCNC: 2 G/DL (ref 3.4–5)
ALP SERPL-CCNC: 41 U/L (ref 40–150)
ALT SERPL W P-5'-P-CCNC: 53 U/L (ref 0–70)
ANION GAP SERPL CALCULATED.3IONS-SCNC: 4 MMOL/L (ref 3–14)
AST SERPL W P-5'-P-CCNC: 50 U/L (ref 0–45)
BILIRUB DIRECT SERPL-MCNC: 1.8 MG/DL (ref 0–0.2)
BILIRUB SERPL-MCNC: 2.5 MG/DL (ref 0.2–1.3)
BUN SERPL-MCNC: 51 MG/DL (ref 7–30)
CALCIUM SERPL-MCNC: 7.3 MG/DL (ref 8.5–10.1)
CHLORIDE SERPL-SCNC: 100 MMOL/L (ref 94–109)
CO2 SERPL-SCNC: 30 MMOL/L (ref 20–32)
CREAT SERPL-MCNC: 1.97 MG/DL (ref 0.66–1.25)
ERYTHROCYTE [DISTWIDTH] IN BLOOD BY AUTOMATED COUNT: 12.7 % (ref 10–15)
GFR SERPL CREATININE-BSD FRML MDRD: 39 ML/MIN/1.7M2
GLUCOSE SERPL-MCNC: 122 MG/DL (ref 70–99)
HCT VFR BLD AUTO: 37.7 % (ref 40–53)
HGB BLD-MCNC: 12.6 G/DL (ref 13.3–17.7)
MCH RBC QN AUTO: 32.6 PG (ref 26.5–33)
MCHC RBC AUTO-ENTMCNC: 33.4 G/DL (ref 31.5–36.5)
MCV RBC AUTO: 98 FL (ref 78–100)
PLATELET # BLD AUTO: 192 10E9/L (ref 150–450)
POTASSIUM SERPL-SCNC: 3.6 MMOL/L (ref 3.4–5.3)
PROT SERPL-MCNC: 6 G/DL (ref 6.8–8.8)
RBC # BLD AUTO: 3.86 10E12/L (ref 4.4–5.9)
SODIUM SERPL-SCNC: 134 MMOL/L (ref 133–144)
WBC # BLD AUTO: 9.3 10E9/L (ref 4–11)

## 2017-04-30 PROCEDURE — 99233 SBSQ HOSP IP/OBS HIGH 50: CPT | Performed by: INTERNAL MEDICINE

## 2017-04-30 PROCEDURE — 25000128 H RX IP 250 OP 636: Performed by: SURGERY

## 2017-04-30 PROCEDURE — 25000128 H RX IP 250 OP 636: Performed by: HOSPITALIST

## 2017-04-30 PROCEDURE — 25000125 ZZHC RX 250: Performed by: SURGERY

## 2017-04-30 PROCEDURE — 36415 COLL VENOUS BLD VENIPUNCTURE: CPT | Performed by: HOSPITALIST

## 2017-04-30 PROCEDURE — 25000125 ZZHC RX 250: Performed by: HOSPITALIST

## 2017-04-30 PROCEDURE — S0028 INJECTION, FAMOTIDINE, 20 MG: HCPCS | Performed by: HOSPITALIST

## 2017-04-30 PROCEDURE — 80076 HEPATIC FUNCTION PANEL: CPT | Performed by: HOSPITALIST

## 2017-04-30 PROCEDURE — 85027 COMPLETE CBC AUTOMATED: CPT | Performed by: HOSPITALIST

## 2017-04-30 PROCEDURE — 12000007 ZZH R&B INTERMEDIATE

## 2017-04-30 PROCEDURE — 80048 BASIC METABOLIC PNL TOTAL CA: CPT | Performed by: HOSPITALIST

## 2017-04-30 RX ADMIN — HEPARIN SODIUM 5000 UNITS: 10000 INJECTION, SOLUTION INTRAVENOUS; SUBCUTANEOUS at 17:25

## 2017-04-30 RX ADMIN — TAZOBACTAM SODIUM AND PIPERACILLIN SODIUM 3.38 G: 375; 3 INJECTION, SOLUTION INTRAVENOUS at 13:59

## 2017-04-30 RX ADMIN — HEPARIN SODIUM 5000 UNITS: 10000 INJECTION, SOLUTION INTRAVENOUS; SUBCUTANEOUS at 11:32

## 2017-04-30 RX ADMIN — ACETAMINOPHEN 1000 MG: 10 INJECTION, SOLUTION INTRAVENOUS at 10:28

## 2017-04-30 RX ADMIN — HEPARIN SODIUM 5000 UNITS: 10000 INJECTION, SOLUTION INTRAVENOUS; SUBCUTANEOUS at 02:11

## 2017-04-30 RX ADMIN — ACETAMINOPHEN 1000 MG: 10 INJECTION, SOLUTION INTRAVENOUS at 17:25

## 2017-04-30 RX ADMIN — TAZOBACTAM SODIUM AND PIPERACILLIN SODIUM 3.38 G: 375; 3 INJECTION, SOLUTION INTRAVENOUS at 21:48

## 2017-04-30 RX ADMIN — TAZOBACTAM SODIUM AND PIPERACILLIN SODIUM 3.38 G: 375; 3 INJECTION, SOLUTION INTRAVENOUS at 09:26

## 2017-04-30 RX ADMIN — FAMOTIDINE 20 MG: 10 INJECTION, SOLUTION INTRAVENOUS at 04:32

## 2017-04-30 RX ADMIN — SODIUM CHLORIDE: 9 INJECTION, SOLUTION INTRAVENOUS at 13:59

## 2017-04-30 RX ADMIN — TAZOBACTAM SODIUM AND PIPERACILLIN SODIUM 3.38 G: 375; 3 INJECTION, SOLUTION INTRAVENOUS at 02:11

## 2017-04-30 RX ADMIN — Medication: at 13:59

## 2017-04-30 RX ADMIN — Medication: at 06:13

## 2017-04-30 NOTE — PROGRESS NOTES
[facility]   General Surgery Progress Note           Assessment and Plan:   Assessment:    POD#2 s/p Procedure(s):  Laparoscopy converted to laparotomy, appendectomy - Wound Class: IV-Dirty or Infected   Lysis of adhesions.  For perforated appendicitis and sbo.  Ileus as expected.     Plan:    Continue npo, IVF, NG tube.  oob and ambulate c assist.  Pepcid for stress ulcer prophylaxis.  Subcutaneous heparin for dvt prophylaxis.  Antibiotics: Zosyn                 Interval History:   No flatus or BM.  NG output high.  abd pain controlled with PCA.         Physical Exam:   Blood pressure 125/78, pulse 92, temperature 97.1  F (36.2  C), temperature source Oral, resp. rate 16, height 1.829 m (6'), weight 85.3 kg (188 lb), SpO2 99 %.    I/O last 3 completed shifts:  In: 2241 [I.V.:2241]  Out: 2645 [Urine:1550; Emesis/NG output:1050; Drains:45]    Abdomen: soft, distended, appropriately ttp  Inc(s) -  Widely spaced staples with packing strips between, packing removed today.  Without erythema.  Without subcutaneous fluid or masses.   LAMONTE serosang.            Data:     Recent Labs   Lab Test  04/30/17   0605  04/29/17   0616  04/28/17   1345   HGB  12.6*  14.4  16.2   WBC  9.3  5.5  7.3       Lesley Dodson MD

## 2017-04-30 NOTE — PLAN OF CARE
Problem: Goal Outcome Summary  Goal: Goal Outcome Summary  Outcome: Improving  Vital signs stable.  Lungs clear, diminished in bases, on O2 at 2 lpm nasal cannula with continuous pulse oximetry.Pt encouraged inspirometer use.  Bowel sounds absent, abdomen slightly  firm, , dressing intact to midline incision, small amount dried drainage noted, shant drain patent, same stripped q 4 hrlyl .Corset brace on.  Ambulated with sba of 2.Pcd's on  In bed.  NPO.,denies nausea.Ng to LIS, brown returns.  Pain controlled  With iv tylenol and pca dilaudid.  Post op plan of care reviewed with pt.

## 2017-04-30 NOTE — PLAN OF CARE
Problem: Goal Outcome Summary  Goal: Goal Outcome Summary  Outcome: Improving  NG patent, LAMONTE stripped q4h, pt up to BR to void. Bowel signs not audible. Dressing and packing changed by MD. Pt has new abd binder 1 size bigger. SBA with ambulation. abd distended, tender, firm. Pain managed with PCA dilaudid. Pt stated when watching tv sometimes things are visually manipulated, will update MD. 2lpm. Will continue to monitor.

## 2017-04-30 NOTE — PROGRESS NOTES
[facility]    Hospitalist Progress Note  Name: Jaspreet Whaley    MRN: [id number]  Provider:  Robin Weaver MD  Date of Service: 04/30/2017    Summary of Stay: Jaspreet Whaley is a 31 year old male with no medical hx admitted on 4/28/2017 with acute appendicitis requiring laparoscopy converted to laparotomy appendectomy.  Found to have acute perforated appendicitis with multiple abscesses, fecaliths, SBO, and peritonitis. Intra op Cx pending but several organisms on gram stain. Currently NPO, on IVF with NGT, dilaudid PCA and LAMONTE drain. In ED, also found to have prerenal RAMAN and hyponatremia to 125, both improving with IVF. Was given ertapenem x1 in ED, now on IV zosyn.    Problem List:   1. Acute perforated appendicitis with multiple abscesses, fecaliths, SBO, and peritonitis s/p open laparotomy with appendectomy 4/28: Appreciate general surgery management. Intra op Cx pending but several organisms on gram stain. Remains stable. Intra op Cx showing multiple organisms. Currently NPO, on IVF with NGT, dilaudid PCA and LAMONTE drain. On zosyn.    2. Prerenal RAMAN: His abdominal sx had been ongoing several days with vomiting and limited PO. Creatinine on presentation was 3.0, now down to 1.97. Avoid nephrotoxins. Monitor UOP, appears adequate thus far.    3. Hypovolemic hyponatremia: Again, volume depleted on presentation. Na 125 on admission, appropriate rate of rise to 130 today. Continue NS. BMP in AM.    DVT Prophylaxis: Heparin SQ  Code Status: Full Code  Disposition: Expected discharge in 3 days to home. Goals prior to discharge include recover from large surgical intervention.   Family updated today: Yes      Interval History   Pt doing well. No chest pain or shortness of breath. No nausea, vomiting, diarrhea, constipation. Abdominal pain controlled on PCA. No fevers. No other complaints identified.     -Data reviewed today: I reviewed all new labs and imaging results over the last 24 hours.      Physical Exam   Temp: 97.1  F (36.2  C) Temp src: Oral BP: 125/78 Pulse: 92 Heart Rate: 95 Resp: 16 SpO2: 99 % O2 Device: Nasal cannula Oxygen Delivery: 2 LPM  Vitals:    04/28/17 1300   Weight: 85.3 kg (188 lb)     Vital Signs with Ranges  Temp:  [96.9  F (36.1  C)-98  F (36.7  C)] 97.1  F (36.2  C)  Pulse:  [] 92  Heart Rate:  [] 95  Resp:  [16] 16  BP: (125-138)/(76-81) 125/78  SpO2:  [96 %-99 %] 99 %  I/O last 3 completed shifts:  In: 2241 [I.V.:2241]  Out: 2645 [Urine:1550; Emesis/NG output:1050; Drains:45]    GENERAL: No apparent distress. Awake, alert, and fully oriented.  HEENT: Normocephalic, atraumatic. Extraocular movements intact.  CARDIOVASCULAR: Regular rate and rhythm without murmurs or rubs. No S3.  PULMONARY: Clear bilaterally.  GASTROINTESTINAL: Soft, mild tenderness rLq, non-distended. Bowel sounds normoactive.   EXTREMITIES: No cyanosis or clubbing. No edema.  NEUROLOGICAL: CN 2-12 grossly intact, no focal neurological deficits.  DERMATOLOGICAL: No rash, ulcer, bruising, nor jaundice.     Medications     NaCl 125 mL/hr at 04/30/17 1359       famotidine  20 mg Intravenous Q24H     sodium chloride (PF)  3 mL Intracatheter Q8H     HYDROmorphone   Intravenous PCA     piperacillin-tazobactam  3.375 g Intravenous Q6H     acetaminophen  1,000 mg Intravenous Q8H     heparin  5,000 Units Subcutaneous Q8H     Data     Laboratory:    Recent Labs  Lab 04/30/17  0605 04/29/17  0616 04/28/17  1345   WBC 9.3 5.5 7.3   HGB 12.6* 14.4 16.2   HCT 37.7* 41.0 44.4   MCV 98 94 91    167 158       Recent Labs  Lab 04/30/17  0605 04/29/17  0616 04/28/17  1345    130* 125*   POTASSIUM 3.6 3.7 3.1*   CHLORIDE 100 96 84*   CO2 30 24 29   ANIONGAP 4 10 12   * 142* 120*   BUN 51* 66* 84*   CR 1.97* 2.34* 2.96*   GFRESTIMATED 39* 32* 24*   GFRESTBLACK 47* 39* 30*   JEAN MARIE 7.3* 7.4* 8.9       Recent Labs  Lab 04/30/17  0605 04/28/17  1345   AST 50* 51*   ALT 53 62   ALKPHOS 41 70   BILITOTAL  2.5* 2.6*       Recent Labs  Lab 04/28/17  1627   LACT 1.1       Recent Labs  Lab 04/28/17  1345   LIPASE 210       Recent Labs  Lab 04/28/17  2139   CULT Moderate growth Escherichia coli Susceptibility testing in progressHeavy growth beta hemolytic  Streptococcus constellatus Susceptibility testing in progressCulture in progress*  Light growth Escherichia coliHeavy growth beta hemolytic  Streptococcus constellatusSusceptibility testing done on previous specimenCulture in progress*  Culture negative monitoring continues       Imaging:  No results found for this or any previous visit (from the past 24 hour(s)).      Robin Weaver MD  [facility] Hospitalist  [address] [facility] [place] [address]  04/30/2017

## 2017-04-30 NOTE — PLAN OF CARE
Problem: Goal Outcome Summary  Goal: Goal Outcome Summary  Afebrile vss pain 3/10 relief with pca.  Abdomen distended no bowel sounds no flatus dressing moderate amount of drainage no change from the beginning of the shift to the end.  Abdominal binder on.  LAMONTE  drain stripped every 4 hours.  Ng AWA goyal drainage cainster changed.

## 2017-05-01 LAB
ANION GAP SERPL CALCULATED.3IONS-SCNC: 8 MMOL/L (ref 3–14)
BACTERIA SPEC CULT: ABNORMAL
BUN SERPL-MCNC: 35 MG/DL (ref 7–30)
CALCIUM SERPL-MCNC: 7.9 MG/DL (ref 8.5–10.1)
CHLORIDE SERPL-SCNC: 106 MMOL/L (ref 94–109)
CO2 SERPL-SCNC: 26 MMOL/L (ref 20–32)
CREAT SERPL-MCNC: 1.4 MG/DL (ref 0.66–1.25)
GFR SERPL CREATININE-BSD FRML MDRD: 58 ML/MIN/1.7M2
GLUCOSE SERPL-MCNC: 97 MG/DL (ref 70–99)
HGB BLD-MCNC: 11 G/DL (ref 13.3–17.7)
Lab: ABNORMAL
MICRO REPORT STATUS: ABNORMAL
MICROORGANISM SPEC CULT: ABNORMAL
MICROORGANISM SPEC CULT: ABNORMAL
PLATELET # BLD AUTO: 256 10E9/L (ref 150–450)
POTASSIUM SERPL-SCNC: 3.5 MMOL/L (ref 3.4–5.3)
SODIUM SERPL-SCNC: 140 MMOL/L (ref 133–144)
SPECIMEN SOURCE: ABNORMAL

## 2017-05-01 PROCEDURE — 25000128 H RX IP 250 OP 636: Performed by: INTERNAL MEDICINE

## 2017-05-01 PROCEDURE — 12000000 ZZH R&B MED SURG/OB

## 2017-05-01 PROCEDURE — 25000125 ZZHC RX 250: Performed by: SURGERY

## 2017-05-01 PROCEDURE — 25000125 ZZHC RX 250: Performed by: HOSPITALIST

## 2017-05-01 PROCEDURE — 25000125 ZZHC RX 250: Performed by: INTERNAL MEDICINE

## 2017-05-01 PROCEDURE — 36415 COLL VENOUS BLD VENIPUNCTURE: CPT | Performed by: SURGERY

## 2017-05-01 PROCEDURE — 99232 SBSQ HOSP IP/OBS MODERATE 35: CPT | Performed by: INTERNAL MEDICINE

## 2017-05-01 PROCEDURE — 25000128 H RX IP 250 OP 636: Performed by: HOSPITALIST

## 2017-05-01 PROCEDURE — 25000128 H RX IP 250 OP 636: Performed by: SURGERY

## 2017-05-01 PROCEDURE — 85018 HEMOGLOBIN: CPT | Performed by: SURGERY

## 2017-05-01 PROCEDURE — 85049 AUTOMATED PLATELET COUNT: CPT | Performed by: SURGERY

## 2017-05-01 PROCEDURE — S0028 INJECTION, FAMOTIDINE, 20 MG: HCPCS | Performed by: HOSPITALIST

## 2017-05-01 PROCEDURE — 80048 BASIC METABOLIC PNL TOTAL CA: CPT | Performed by: SURGERY

## 2017-05-01 RX ORDER — AMPICILLIN AND SULBACTAM 2; 1 G/1; G/1
3 INJECTION, POWDER, FOR SOLUTION INTRAMUSCULAR; INTRAVENOUS EVERY 6 HOURS
Status: DISCONTINUED | OUTPATIENT
Start: 2017-05-01 | End: 2017-05-06 | Stop reason: HOSPADM

## 2017-05-01 RX ORDER — MORPHINE SULFATE 2 MG/ML
2-4 INJECTION, SOLUTION INTRAMUSCULAR; INTRAVENOUS
Status: DISCONTINUED | OUTPATIENT
Start: 2017-05-01 | End: 2017-05-06 | Stop reason: HOSPADM

## 2017-05-01 RX ADMIN — HEPARIN SODIUM 5000 UNITS: 10000 INJECTION, SOLUTION INTRAVENOUS; SUBCUTANEOUS at 17:58

## 2017-05-01 RX ADMIN — TAZOBACTAM SODIUM AND PIPERACILLIN SODIUM 3.38 G: 375; 3 INJECTION, SOLUTION INTRAVENOUS at 14:41

## 2017-05-01 RX ADMIN — FAMOTIDINE 20 MG: 10 INJECTION, SOLUTION INTRAVENOUS at 02:26

## 2017-05-01 RX ADMIN — TAZOBACTAM SODIUM AND PIPERACILLIN SODIUM 3.38 G: 375; 3 INJECTION, SOLUTION INTRAVENOUS at 08:59

## 2017-05-01 RX ADMIN — MORPHINE SULFATE 2 MG: 2 INJECTION, SOLUTION INTRAMUSCULAR; INTRAVENOUS at 15:02

## 2017-05-01 RX ADMIN — Medication: at 06:51

## 2017-05-01 RX ADMIN — ACETAMINOPHEN 1000 MG: 10 INJECTION, SOLUTION INTRAVENOUS at 19:12

## 2017-05-01 RX ADMIN — HEPARIN SODIUM 5000 UNITS: 10000 INJECTION, SOLUTION INTRAVENOUS; SUBCUTANEOUS at 02:26

## 2017-05-01 RX ADMIN — HEPARIN SODIUM 5000 UNITS: 10000 INJECTION, SOLUTION INTRAVENOUS; SUBCUTANEOUS at 10:43

## 2017-05-01 RX ADMIN — AMPICILLIN SODIUM AND SULBACTAM SODIUM 3 G: 2; 1 INJECTION, POWDER, FOR SOLUTION INTRAMUSCULAR; INTRAVENOUS at 23:55

## 2017-05-01 RX ADMIN — ACETAMINOPHEN 1000 MG: 10 INJECTION, SOLUTION INTRAVENOUS at 10:43

## 2017-05-01 RX ADMIN — TAZOBACTAM SODIUM AND PIPERACILLIN SODIUM 3.38 G: 375; 3 INJECTION, SOLUTION INTRAVENOUS at 03:06

## 2017-05-01 RX ADMIN — AMPICILLIN SODIUM AND SULBACTAM SODIUM 3 G: 2; 1 INJECTION, POWDER, FOR SOLUTION INTRAMUSCULAR; INTRAVENOUS at 17:58

## 2017-05-01 RX ADMIN — SODIUM CHLORIDE: 9 INJECTION, SOLUTION INTRAVENOUS at 21:03

## 2017-05-01 RX ADMIN — ACETAMINOPHEN 1000 MG: 10 INJECTION, SOLUTION INTRAVENOUS at 02:25

## 2017-05-01 RX ADMIN — MORPHINE SULFATE 2 MG: 2 INJECTION, SOLUTION INTRAMUSCULAR; INTRAVENOUS at 17:58

## 2017-05-01 RX ADMIN — SODIUM CHLORIDE: 9 INJECTION, SOLUTION INTRAVENOUS at 08:59

## 2017-05-01 NOTE — PLAN OF CARE
Problem: Goal Outcome Summary  Goal: Goal Outcome Summary  A/O, VSS pain controlled with pca of dilauidid.  Abdomen distended faint BS no flatus no nausea. Abdominal binder on dressing cdi shant drain serous drainage

## 2017-05-01 NOTE — PROGRESS NOTES
[facility]   General Surgery Progress Note         Assessment and Plan:   Assessment:    POD#3 s/p Procedure(s):  Laparoscopy converted to laparotomy, appendectomy - Wound Class: IV-Dirty or Infected   Lysis of adhesions. For perforated appendicitis and sbo.  Ileus as expected - resolving     Plan:    Start clears, slowly in small amounts  Pain Mgmt: Dilaudid PCA, Ofirmev scheduled  Increase activity, walking halls 3-4 times per day  Pepcid for stress ulcer prophylaxis.  Subcutaneous heparin for dvt prophylaxis.  Antibiotics: Zosyn         Interval History:   Resting in bed, comfortable. He had a loose BM this morning. Still feels bloated. He has incisional soreness as expected. He tells me that the PCA gives him hallucinations so he has been avoiding using it. +NPO, +voiding,+ambulating.         Physical Exam:   Blood pressure 142/89, pulse 97, temperature 97.8  F (36.6  C), temperature source Oral, resp. rate 18, height 1.829 m (6'), weight 85.3 kg (188 lb), SpO2 95 %.    I/O last 3 completed shifts:  In: 1037 [I.V.:1037]  Out: 963 [Urine:300; Emesis/NG output:650; Drains:13]    Abdomen: soft, distended, appropriately ttp  Inc(s) -  C/D/I with widely spaced staples  LAMONTE scant serosang.            Data:     Recent Labs  Lab 05/01/17  0624 04/30/17  0605 04/29/17  0616 04/28/17  1345   WBC  --  9.3 5.5 7.3   HGB 11.0* 12.6* 14.4 16.2   HCT  --  37.7* 41.0 44.4   MCV  --  98 94 91    192 167 158         Sridhar Prather PA-C     Seen and agree,    Alex Witt MD  [facility]

## 2017-05-01 NOTE — DISCHARGE INSTRUCTIONS
A post hospital follow up has been scheduled for you with your PCP, Dr. Serrano at [facility] in [place].    Appointment scheduled for 5/15 at 11:00am.    Please arrive 15 minutes prior to appointment.  Please bring photo ID and Insurance information.    Clinic Address  [address] [place] [address]    HOME CARE FOLLOWING ABDOMINAL SURGERY  Bobby Granger, GISELA Witt, DARRELL Dodson, JOHNNY Sheikh, GISELA Pacheco, KELVIN Escobedo     Special instructions for Jaspreet Whaley:  --Staples to be removed May 9 or 10  -Antibiotic to be taken twice daily for 3 days (Augmentin: Amoxicillin/Clavulanate)     INCISIONAL CARE:  Replace the bandage over your incision (or incisions) until all drainage stops, or if more comfortable to have in place.  If present, leave the steri-strips (white paper tapes) in place for 14 days after surgery.  If you have staples in your incision at the time of discharge, they will be removed at your follow-up appointment.  If Dermabond (a type of skin glue) is present, leave in place until it wears/flakes off.     BATHING:  Avoid baths for 1 week after surgery.  Showers are okay.  You may wash your hair at any time.  Gently pat your incision dry after bathing.    ACTIVITY:  Light Activity -- you may immediately be up and about as tolerated.  Driving -- you may drive when comfortable and off narcotic pain medications.  Light Work -- resume when comfortable off pain medications.  (If you can drive, you probably can work.)  Strenuous Work/Activity -- limit lifting to 20 pounds for 4 weeks.  Then, progressively increase with time.  Active Sports (running, biking, etc.) -- cautiously resume after 6 weeks.    DISCOMFORT:  Use pain medications as prescribed by your surgeon.  Take the pain medication with some food, when possible, to minimize side effects.  Expect gradual improvement.    DIET:  Return to diet you were on before surgery, unless you are given specific diet  instructions.  Drink plenty of fluids.  While taking pain medications, increase dietary fiber or add a fiber supplementation like Metamucil or Citrucel to help prevent constipation - a possible side effect of pain medications.    NAUSEA:  If nauseated from the anesthetic/pain meds; rest in bed, get up cautiously with assistance, and drink clear liquids (juice, tea, broth).    RETURN APPOINTMENT:  Schedule a follow-up visit 2-3 weeks after discharge from the hospital.  Office Phone:  [phone]     CONTACT US IF THE FOLLOWING DEVELOPS:   1. A fever that is above 101     2. If there is a large amount of drainage, bleeding, or swelling.   3. Severe pain that is not relieved by your prescription.   4. Drainage that is thick, cloudy, yellow, green or white.   5. Any other questions not answered by  Frequently Asked Questions  sheet.      FREQUENTLY ASKED QUESTIONS:    Q:  How should my incision look?    A:  Normally your incision will appear slightly swollen with light redness directly along the incision itself as it heals.  It may feel like a bump or ridge as the healing/scarring happens, and over time (3-4 months) this bump or ridge feeling should slowly go away.  In general, clear or pink watery drainage can be normal at first as your incision heals, but should decrease over time.    Q:  How do I know if my incision is infected?  A:  Look at your incision for signs of infection, like redness around the incision spreading to surrounding skin, or drainage of cloudy or foul-smelling drainage.  If you feel warm, check your temperature to see if you are running a fever.    **If any of these things occur, please notify the nurse at our office.  We may need you to come into the office for an incision check.      Q:  How do I take care of my incision?  A:  If you have a dressing in place - Starting the day after surgery, replace the dressing 1-2 times a day until there is no further drainage from the incision.  At that  time, a dressing is no longer needed.  Try to minimize tape on the skin if irritation is occurring at the tape sites.  If you have significant irritation from tape on the skin, please call the office to discuss other method of dressing your incision.    Small pieces of tape called  steri-strips  may be present directly overlying your incision; these may be removed 10 days after surgery unless otherwise specified by your surgeon.  If these tapes start to loosen at the ends, you may trim them back until they fall off or are removed.    A:  If you had  Dermabond  tissue glue used as a dressing (this causes your incision to look shiny with a clear covering over it) - This type of dressing wears off with time and does not require more dressings over the top unless it is draining around the glue as it wears off.  Do not apply ointments or lotions over the incisions until the glue has completely worn off.    Q:  There is a piece of tape or a sticky  lead  still on my skin.  Can I remove this?  A:  Sometimes the sticky  leads  used for monitoring during surgery or for evaluation in the emergency department are not all removed while you are in the hospital.  These sometimes have a tab or metal dot on them.  You can easily remove these on your own, like taking off a band-aid.  If there is a gel substance under the  lead , simply wipe/clean it off with a washcloth or paper towel.      Q:  What can I do to minimize constipation (very hard stools, or lack of stools)?  A:  Stay well hydrated.  Increase your dietary fiber intake or take a fiber supplement -with plenty of water.  Walk around frequently.  You may consider an over-the-counter stool-softener.  Your Pharmacist can assist you with choosing one that is stocked at your pharmacy.  Constipation is also one of the most common side effects of pain medication.  If you are using pain medication, be pro-active and try to PREVENT problems with constipation by taking the steps  above BEFORE constipation becomes a problem.    Q:  What do I do if I need more pain medications?  A:  Call the office to receive refills.  Be aware that certain pain meds cannot be called into a pharmacy and actually require a paper prescription.  A change may be made in your pain med as you progress thru your recovery period or if you have side effects to certain meds.    --Pain meds are NOT refilled after 5pm on weekdays, and NOT AT ALL on the weekends, so please look ahead to prevent problems.      Q:  Why am I having a hard time sleeping now that I am at home?  A:  Many medications you receive while you are in the hospital can impact your sleep for a number of days after your surgery/hospitalization.  Decreased level of activity and naps during the day may also make sleeping at night difficult.  Try to minimize day-time naps, and get up frequently during the day to walk around your home during your recovery time.  Sleep aides may be of some help, but are not recommended for long-term use.      Q:  I am having some back discomfort.  What should I do?  A:  This may be related to certain positioning that was required for your surgery, extended periods of time in bed, or other changes in your overall activity level.  You may try ice, heat, acetaminophen, or ibuprofen to treat this temporarily.  Note that many pain medications have acetaminophen in them and would state this on the prescription bottle.  Be sure not to exceed the maximum of 4000mg per day of acetaminophen.     **If the pain you are having does not resolve, is severe, or is a flare of back pain you have had on other occasions prior to surgery, please contact your primary physician for further recommendations or for an appointment to be examined at their office.    Q:  Why am I having headaches?  A:  Headaches can be caused by many things:  caffeine withdrawal, use of pain meds, dehydration, high blood pressure, lack of sleep, over-activity/exhaustion,  flare-up of usual migraine headaches.  If you feel this is related to muscle tension (a band-like feeling around the head, or a pressure at the low-back of the head) you may try ice or heat to this area.  You may need to drink more fluids (try electrolyte drink like Gatorade), rest, or take your usual migraine medications.   **If your headaches do not resolve, worsen, are accompanied by other symptoms, or if your blood pressure is high, please call your primary physician for recommendation and/or examination.    Q:  I am unable to urinate.  What do I do?  A:  A small percentage of people can have difficulty urinating initially after surgery.  This includes being able to urinate only a very small amount at a time and feeling discomfort or pressure in the very low abdomen.  This is called  urinary retention , and is actually an urgent situation.  Proceed to your nearest Emergency department for evaluation (not an Urgent Care Center).  Sometimes the bladder does not work correctly after certain medications you receive during surgery, or related to certain procedures.  You may need to have a catheter placed until your bladder recovers.  When planning to go to an Emergency department, it may help to call the ER to let them know you are coming in for this problem after a surgery.  This may help you get in quicker to be evaluated.  **If you have symptoms of a urinary tract infection, please contact your primary physician for the proper evaluation and treatment.          If you have other questions, please call the office Monday thru Friday between 8am and 5pm to discuss with the nurse or physician assistant.  #[phone]    There is a surgeon ON CALL on weekday evenings and over the weekend in case of urgent need only, and may be contacted at the same number.    If you are having an emergency, call 911 or proceed to your nearest emergency department.      [phone]

## 2017-05-01 NOTE — CONSULTS
[facility]  Antimicrobial Stewardship Team (AST) Note            To: Hospitalist  Unit: [id number]  Patient Name: Jaspreet Whaley  Allergies: NKAs    Brief Summary:  Patient is a 31 year old male with no medical hx admitted on 4/28/2017 with acute appendicitis requiring laparoscopy converted to laparotomy appendectomy. Found to have acute perforated appendicitis with multiple abscesses, fecaliths, SBO, and peritonitis. Today is POD #3. Patient is currently being treated with Zosyn (Day 3).            Assessment: WBC = 9.3 (4/30), Tmax = 99.8, culture from abdominal abscess is positive for moderate growth E. coli, & heavy growth beta hemolytic Streptococcus constellatus. Both organisms are sensitive to Unasyn.      Current Antibiotic therapy: Zosyn 3.375 grams IV q6hrs (4/29@0300-Day 3),   Clinical Features/Vital Signs (VS):                  Culture Results:  Date Culture Site Organism   4/28 Abdominal abscess Moderate growth E. coli, Heavy growth beta hemolytic Streptococcus Constellatus                               Imaging:                Recommendation/Interventions:    Abdominal abscess is positive for moderate growth E. coli, & Heavy growth beta hemolytic Streptococcus constellatus. Both organisms are sensitive to Unasyn. May wish to consider narrowing antibiotic coverage from Zosyn to Unasyn.       Discussed w/ ID Staff-Dr Derrick Myers, PharmD, [facility]  Pharmacy Clinical Manager  [phone]

## 2017-05-01 NOTE — PROGRESS NOTES
[facility]    Hospitalist Progress Note  Name: Jaspreet Whaley    MRN: [id number]  Provider:  Robin Weaver MD  Date of Service: 05/01/2017    Summary of Stay: Jaspreet Whaley is a 31 year old male with no medical hx admitted on 4/28/2017 with acute appendicitis requiring laparoscopy converted to laparotomy appendectomy.  Found to have acute perforated appendicitis with multiple abscesses, fecaliths, SBO, and peritonitis. Intra op Cx pending but several organisms on gram stain. Currently NPO, on IVF with NGT, dilaudid PCA and LAMONTE drain. In ED, also found to have prerenal RAMAN and hyponatremia to 125, both improving with IVF. Was given ertapenem x1 in ED, now on IV zosyn.    Problem List:   1. Acute perforated appendicitis with multiple abscesses, fecaliths, SBO, and peritonitis s/p open laparotomy with appendectomy 4/28: Appreciate general surgery management. Intra op Cx pending but several organisms on gram stain. Remains stable. Intra op Cx showing multiple organisms. Started on clears; cont IVF with NGT. On zosyn but per antibiotic stewardship team, requesting transition to Unasyn which I will order.  Patient also is not fond of his Dilaudid PCA as he is having some visual disturbances but no focal neurologic complaints.  Will DC Dilaudid PCA and start patient on p.r.n. morphine.    2. Prerenal RAMAN: His abdominal sx had been ongoing several days with vomiting and limited PO. Creatinine on presentation was 3.0, now down to 1.4. Avoid nephrotoxins. Monitor UOP, appears adequate thus far.    3. Hypovolemic hyponatremia: Again, volume depleted on presentation. Na 125 on admission, appropriate rate of rise to 130 today. Continue NS.  Continue to monitor for now.    DVT Prophylaxis: Heparin SQ  Code Status: Full Code  Disposition: Expected discharge in 2-3 days to home. Goals prior to discharge include recover from large surgical intervention.   Family updated today: Yes      Interval History   Pt doing  well. No chest pain or shortness of breath. No nausea, vomiting, diarrhea, constipation. Abdominal pain controlled on PCA. No fevers. No other complaints identified.     -Data reviewed today: I reviewed all new labs and imaging results over the last 24 hours.     Physical Exam   Temp: 99.8  F (37.7  C) Temp src: Oral BP: 130/78 Pulse: 90 Heart Rate: 98 Resp: 18 SpO2: 98 % O2 Device: None (Room air) Oxygen Delivery: 2 LPM  Vitals:    04/28/17 1300   Weight: 85.3 kg (188 lb)     Vital Signs with Ranges  Temp:  [97.8  F (36.6  C)-99.8  F (37.7  C)] 99.8  F (37.7  C)  Pulse:  [90-97] 90  Heart Rate:  [] 98  Resp:  [16-18] 18  BP: (130-142)/(78-89) 130/78  SpO2:  [95 %-99 %] 98 %  I/O last 3 completed shifts:  In: 360 [P.O.:60; I.V.:300]  Out: 1413 [Urine:750; Emesis/NG output:650; Drains:13]    GENERAL: No apparent distress. Awake, alert, and fully oriented.  HEENT: Normocephalic, atraumatic. Extraocular movements intact.  CARDIOVASCULAR: Regular rate and rhythm without murmurs or rubs. No S3.  PULMONARY: Clear bilaterally.  GASTROINTESTINAL: Soft, mild tenderness rLq, non-distended. Bowel sounds normoactive.   EXTREMITIES: No cyanosis or clubbing. No edema.  NEUROLOGICAL: CN 2-12 grossly intact, no focal neurological deficits.  DERMATOLOGICAL: No rash, ulcer, bruising, nor jaundice.     Medications     NaCl 100 mL/hr at 05/01/17 1240       famotidine  20 mg Intravenous Q24H     sodium chloride (PF)  3 mL Intracatheter Q8H     piperacillin-tazobactam  3.375 g Intravenous Q6H     acetaminophen  1,000 mg Intravenous Q8H     heparin  5,000 Units Subcutaneous Q8H     Data     Laboratory:    Recent Labs  Lab 05/01/17  0624 04/30/17  0605 04/29/17  0616 04/28/17  1345   WBC  --  9.3 5.5 7.3   HGB 11.0* 12.6* 14.4 16.2   HCT  --  37.7* 41.0 44.4   MCV  --  98 94 91    192 167 158       Recent Labs  Lab 05/01/17  0624 04/30/17  0605 04/29/17  0616    134 130*   POTASSIUM 3.5 3.6 3.7   CHLORIDE 106 100 96    CO2 26 30 24   ANIONGAP 8 4 10   GLC 97 122* 142*   BUN 35* 51* 66*   CR 1.40* 1.97* 2.34*   GFRESTIMATED 58* 39* 32*   GFRESTBLACK 70 47* 39*   JEAN MARIE 7.9* 7.3* 7.4*       Recent Labs  Lab 04/30/17  0605 04/28/17  1345   AST 50* 51*   ALT 53 62   ALKPHOS 41 70   BILITOTAL 2.5* 2.6*       Recent Labs  Lab 04/28/17  1627   LACT 1.1       Recent Labs  Lab 04/28/17  1345   LIPASE 210       Recent Labs  Lab 04/28/17  2139   CULT Heavy growth Bacteroides ovatusHeavy growth Fusobacterium nucleatumSusceptibility testing not routinely done*  Moderate growth Escherichia coliHeavy growth beta hemolytic  Streptococcus constellatus*  Moderate growth Escherichia coliHeavy growth beta hemolytic  Streptococcus constellatusSusceptibility testing done on previous specimen*       Imaging:  No results found for this or any previous visit (from the past 24 hour(s)).      Robin Weaver MD  [facility] Hospitalist  [address] [facility] [place] [address]  05/01/2017

## 2017-05-01 NOTE — PLAN OF CARE
Problem: Goal Outcome Summary  Goal: Goal Outcome Summary  Outcome: Improving  DAy RN  vss beside low grade temp of 98.7 ax in afternoon, no chills. Faint bs, abd is distended and round, tender to palpation. Burping and did have 2 loose bm's today. Voiding well.  Tolerated sips of water stated apple juice was too strong, no nausea. taking liquids very slow.   pca dc stated visual disturbance when he used it,  iv tylenol and requested morphine x1. Ice to abd x2  Dressing is CDI.  abd binder when out of bed  Up and walked the morris with SBA.  Needs encouragement to do that a couple times a shift.   Awake most of shift.

## 2017-05-01 NOTE — PLAN OF CARE
Problem: Goal Outcome Summary  Goal: Goal Outcome Summary  Outcome: No Change  VSS. On 2L of O2. IV infusing. Pt remains NPO. Pt accidentally removed NG tube while he was sleeping. Pt has had 650cc green-brown output over the last 16hours; MD Dodson paged; ok to leave out unless pt becomes nauseous or is vomiting. Abdomen tender. Drsg CDI. BS not audible. Not passing gas. Voiding in the bathroom or using the beside urinal. Lungs clear. CMS intact. Pt ambulating in the morris with Ax1. Pt using PCA for pain. Dc pending based on progress.

## 2017-05-02 LAB
ANION GAP SERPL CALCULATED.3IONS-SCNC: 9 MMOL/L (ref 3–14)
BACTERIA SPEC CULT: ABNORMAL
BACTERIA SPEC CULT: ABNORMAL
BUN SERPL-MCNC: 24 MG/DL (ref 7–30)
C DIFF TOX B STL QL: NORMAL
CALCIUM SERPL-MCNC: 7.7 MG/DL (ref 8.5–10.1)
CHLORIDE SERPL-SCNC: 107 MMOL/L (ref 94–109)
CO2 SERPL-SCNC: 27 MMOL/L (ref 20–32)
COPATH REPORT: NORMAL
CREAT SERPL-MCNC: 1.19 MG/DL (ref 0.66–1.25)
GFR SERPL CREATININE-BSD FRML MDRD: 70 ML/MIN/1.7M2
GLUCOSE SERPL-MCNC: 99 MG/DL (ref 70–99)
HGB BLD-MCNC: 10.7 G/DL (ref 13.3–17.7)
Lab: ABNORMAL
MICRO REPORT STATUS: ABNORMAL
MICRO REPORT STATUS: ABNORMAL
POTASSIUM SERPL-SCNC: 3.3 MMOL/L (ref 3.4–5.3)
POTASSIUM SERPL-SCNC: 3.4 MMOL/L (ref 3.4–5.3)
SODIUM SERPL-SCNC: 143 MMOL/L (ref 133–144)
SPECIMEN SOURCE: ABNORMAL
SPECIMEN SOURCE: ABNORMAL
SPECIMEN SOURCE: NORMAL

## 2017-05-02 PROCEDURE — 25000132 ZZH RX MED GY IP 250 OP 250 PS 637: Performed by: PHYSICIAN ASSISTANT

## 2017-05-02 PROCEDURE — 36415 COLL VENOUS BLD VENIPUNCTURE: CPT | Performed by: INTERNAL MEDICINE

## 2017-05-02 PROCEDURE — 25000125 ZZHC RX 250: Performed by: INTERNAL MEDICINE

## 2017-05-02 PROCEDURE — 25000132 ZZH RX MED GY IP 250 OP 250 PS 637: Performed by: HOSPITALIST

## 2017-05-02 PROCEDURE — 80048 BASIC METABOLIC PNL TOTAL CA: CPT | Performed by: INTERNAL MEDICINE

## 2017-05-02 PROCEDURE — 25000128 H RX IP 250 OP 636: Performed by: SURGERY

## 2017-05-02 PROCEDURE — 25000128 H RX IP 250 OP 636: Performed by: INTERNAL MEDICINE

## 2017-05-02 PROCEDURE — 84132 ASSAY OF SERUM POTASSIUM: CPT | Performed by: HOSPITALIST

## 2017-05-02 PROCEDURE — 87493 C DIFF AMPLIFIED PROBE: CPT | Performed by: INTERNAL MEDICINE

## 2017-05-02 PROCEDURE — 36415 COLL VENOUS BLD VENIPUNCTURE: CPT | Performed by: HOSPITALIST

## 2017-05-02 PROCEDURE — 25000125 ZZHC RX 250: Performed by: SURGERY

## 2017-05-02 PROCEDURE — 12000011 ZZH R&B MS OVERFLOW

## 2017-05-02 PROCEDURE — 25000125 ZZHC RX 250: Performed by: HOSPITALIST

## 2017-05-02 PROCEDURE — 99232 SBSQ HOSP IP/OBS MODERATE 35: CPT | Performed by: INTERNAL MEDICINE

## 2017-05-02 PROCEDURE — 85018 HEMOGLOBIN: CPT | Performed by: INTERNAL MEDICINE

## 2017-05-02 PROCEDURE — S0028 INJECTION, FAMOTIDINE, 20 MG: HCPCS | Performed by: HOSPITALIST

## 2017-05-02 RX ORDER — ACETAMINOPHEN 500 MG
1000 TABLET ORAL EVERY 8 HOURS
Status: DISCONTINUED | OUTPATIENT
Start: 2017-05-02 | End: 2017-05-06 | Stop reason: HOSPADM

## 2017-05-02 RX ORDER — POTASSIUM CHLORIDE 29.8 MG/ML
20 INJECTION INTRAVENOUS
Status: DISCONTINUED | OUTPATIENT
Start: 2017-05-02 | End: 2017-05-06 | Stop reason: HOSPADM

## 2017-05-02 RX ORDER — POTASSIUM CHLORIDE 7.45 MG/ML
10 INJECTION INTRAVENOUS
Status: DISCONTINUED | OUTPATIENT
Start: 2017-05-02 | End: 2017-05-06 | Stop reason: HOSPADM

## 2017-05-02 RX ORDER — POTASSIUM CL/LIDO/0.9 % NACL 10MEQ/0.1L
10 INTRAVENOUS SOLUTION, PIGGYBACK (ML) INTRAVENOUS
Status: DISCONTINUED | OUTPATIENT
Start: 2017-05-02 | End: 2017-05-06 | Stop reason: HOSPADM

## 2017-05-02 RX ORDER — POTASSIUM CHLORIDE 1500 MG/1
20-40 TABLET, EXTENDED RELEASE ORAL
Status: DISCONTINUED | OUTPATIENT
Start: 2017-05-02 | End: 2017-05-06 | Stop reason: HOSPADM

## 2017-05-02 RX ORDER — POTASSIUM CHLORIDE 1.5 G/1.58G
20-40 POWDER, FOR SOLUTION ORAL
Status: DISCONTINUED | OUTPATIENT
Start: 2017-05-02 | End: 2017-05-06 | Stop reason: HOSPADM

## 2017-05-02 RX ADMIN — AMPICILLIN SODIUM AND SULBACTAM SODIUM 3 G: 2; 1 INJECTION, POWDER, FOR SOLUTION INTRAMUSCULAR; INTRAVENOUS at 23:37

## 2017-05-02 RX ADMIN — POTASSIUM CHLORIDE 10 MEQ: 14.9 INJECTION, SOLUTION, CONCENTRATE PARENTERAL at 13:13

## 2017-05-02 RX ADMIN — HEPARIN SODIUM 5000 UNITS: 10000 INJECTION, SOLUTION INTRAVENOUS; SUBCUTANEOUS at 10:35

## 2017-05-02 RX ADMIN — AMPICILLIN SODIUM AND SULBACTAM SODIUM 3 G: 2; 1 INJECTION, POWDER, FOR SOLUTION INTRAMUSCULAR; INTRAVENOUS at 18:38

## 2017-05-02 RX ADMIN — ONDANSETRON 4 MG: 4 TABLET, ORALLY DISINTEGRATING ORAL at 14:19

## 2017-05-02 RX ADMIN — POTASSIUM CHLORIDE 10 MEQ: 14.9 INJECTION, SOLUTION, CONCENTRATE PARENTERAL at 15:35

## 2017-05-02 RX ADMIN — POTASSIUM CHLORIDE 10 MEQ: 14.9 INJECTION, SOLUTION, CONCENTRATE PARENTERAL at 14:16

## 2017-05-02 RX ADMIN — AMPICILLIN SODIUM AND SULBACTAM SODIUM 3 G: 2; 1 INJECTION, POWDER, FOR SOLUTION INTRAMUSCULAR; INTRAVENOUS at 06:02

## 2017-05-02 RX ADMIN — ACETAMINOPHEN 1000 MG: 500 TABLET, FILM COATED ORAL at 18:38

## 2017-05-02 RX ADMIN — AMPICILLIN SODIUM AND SULBACTAM SODIUM 3 G: 2; 1 INJECTION, POWDER, FOR SOLUTION INTRAMUSCULAR; INTRAVENOUS at 12:09

## 2017-05-02 RX ADMIN — ACETAMINOPHEN 1000 MG: 10 INJECTION, SOLUTION INTRAVENOUS at 02:53

## 2017-05-02 RX ADMIN — SODIUM CHLORIDE: 9 INJECTION, SOLUTION INTRAVENOUS at 10:33

## 2017-05-02 RX ADMIN — HEPARIN SODIUM 5000 UNITS: 10000 INJECTION, SOLUTION INTRAVENOUS; SUBCUTANEOUS at 18:38

## 2017-05-02 RX ADMIN — RANITIDINE HYDROCHLORIDE 150 MG: 150 TABLET, FILM COATED ORAL at 20:18

## 2017-05-02 RX ADMIN — FAMOTIDINE 20 MG: 10 INJECTION, SOLUTION INTRAVENOUS at 02:50

## 2017-05-02 RX ADMIN — MORPHINE SULFATE 2 MG: 2 INJECTION, SOLUTION INTRAMUSCULAR; INTRAVENOUS at 17:29

## 2017-05-02 RX ADMIN — POTASSIUM CHLORIDE 10 MEQ: 14.9 INJECTION, SOLUTION, CONCENTRATE PARENTERAL at 16:44

## 2017-05-02 RX ADMIN — HEPARIN SODIUM 5000 UNITS: 10000 INJECTION, SOLUTION INTRAVENOUS; SUBCUTANEOUS at 02:53

## 2017-05-02 RX ADMIN — MORPHINE SULFATE 2 MG: 2 INJECTION, SOLUTION INTRAMUSCULAR; INTRAVENOUS at 03:11

## 2017-05-02 RX ADMIN — ACETAMINOPHEN 1000 MG: 500 TABLET, FILM COATED ORAL at 10:33

## 2017-05-02 NOTE — PROGRESS NOTES
[facility]   General Surgery Progress Note         Assessment and Plan:   Assessment:    -POD#4 s/p Laparoscopy converted to laparotomy, appendectomy, Lysis of adhesions. For perforated appendicitis and sbo.  -Ileus as expected - resolving slowly  -Loose BMs, C.Diff pending     Plan:    -C.Diff pending  -Diet: remain on clears, slowly in small amounts   -Await decreased bloating and improved appetite prior to further diet advancement.  -Pain Mgmt: Dilaudid PCA, Switch Ofirmev to po acetaminophen -scheduled  -Increase activity, walking halls 3-4 times per day  -Pepcid for stress ulcer prophylaxis.  -Subcutaneous heparin for dvt prophylaxis.  -Antibiotics: Unasyn         Interval History:   Resting in bed, comfortable.  Continued loose BMs; C.Diff pending. Walking well.  Has been taking in water ok today.  Tried apple juice last night and had nausea.  Still feels bloated today; about the same as yesterday.  Voiding.         Physical Exam:   Blood pressure 137/82, pulse 93, temperature 98.2  F (36.8  C), temperature source Axillary, resp. rate 18, height 1.829 m (6'), weight 89.2 kg (196 lb 9.6 oz), SpO2 98 %.    I/O last 3 completed shifts:  In: 3329 [P.O.:620; I.V.:2709]  Out: 1206 [Urine:800; Drains:6; Stool:400]    Abdomen: soft, distended  Incs -  Minimal old drainage on dressing - changed.  Staples in place.  No surrounding erythema  LAMONTE scant serosang in bulb.            Data:     Recent Labs  Lab 05/02/17  0655 05/01/17  0624 04/30/17  0605 04/29/17  0616 04/28/17  1345   WBC  --   --  9.3 5.5 7.3   HGB 10.7* 11.0* 12.6* 14.4 16.2   HCT  --   --  37.7* 41.0 44.4   MCV  --   --  98 94 91   PLT  --  256 192 167 158       Trang Roper PA-C       Patient seen and examined. Agree with above. C diff is negative. Continue clears for now.    Jean Granger MD  [phone] (c)  [phone] (p)     This note was created using voice recognition software. Undetected word substitutions or  other errors may have occurred.

## 2017-05-02 NOTE — PLAN OF CARE
Problem: Goal Outcome Summary  Goal: Goal Outcome Summary  Outcome: Improving  Pt ambulating SBA in morris, pain managed with IV morphine, IV tylenol. Anticoagulant heparin SQ. Up to BR to void. abd distended, tender on palpation to LLQ, drsg CDI, LAMONTE patent, abd binder on. CMS intact. VSS except low grade fever. Not passing flatus. Belching. Will continue to monitor.     Incentive spirometer encouraged- pt states it hurts to take to deep of breaths-pt educated on importance of use    Temp: 98.1  F (36.7  C) Temp src: Temporal BP: 139/80 Pulse: 90 Heart Rate: 86 Resp: 18 SpO2: 95 % O2 Device: None (Room air)     Keep SpO2 monitor on overnight per pt request, may have sleep apnea.

## 2017-05-02 NOTE — PLAN OF CARE
Problem: Goal Outcome Summary  Goal: Goal Outcome Summary  Outcome: No Change  POD #4. VSS. Afebrile. LS clear. BS faint, patient not passing gas but had loose stool during night, Enteric precautions in place, stool ordered. Pain max 4/10, received scheduled IV Tylenol and 2 mg IV morphine x1. Clear liquid diet, tolerating well; denies nausea. Independently ambulating. PIV- NS @ 100 ml/hr. Getting IV Unasyn every 6 hours. LAMONTE drain had 1 ml output during night. Incision with scant drainage noted on lower aspect of incision, staples in place for lap sites. Pt A&O, calls appropriately for assistance. BUN 35, Creatinine 1.40, Pathology results pending. DC pending at this time, home.

## 2017-05-02 NOTE — PROGRESS NOTES
Pt transferred to peds around 1200 today.  Pt doing well, denies need for pain medications at this time. Started on potassium replacements. Cdiff cultures negative, D/C isolation, pt made aware. Pt taking sips of clears.

## 2017-05-02 NOTE — PROGRESS NOTES
[facility]    Hospitalist Progress Note  Name: Jaspreet Whaley    MRN: [id number]  Provider:  Robin Weaver MD  Date of Service: 05/02/2017    Summary of Stay: Jaspreet Whaley is a 31 year old male with no medical hx admitted on 4/28/2017 with acute appendicitis requiring laparoscopy converted to laparotomy appendectomy.  Found to have acute perforated appendicitis with multiple abscesses, fecaliths, SBO, and peritonitis. Intra op Cx pending but several organisms on gram stain. Currently NPO, on IVF with NGT, dilaudid PCA and LAMONTE drain. In ED, also found to have prerenal RAMAN and hyponatremia to 125, both improving with IVF. Was given ertapenem x1 in ED, was on zosyn but transitioned to unasyn on 5/1/17 based on sensitivities.    Problem List:   1. Acute perforated appendicitis with multiple abscesses, fecaliths, SBO, and peritonitis s/p open laparotomy with appendectomy 4/28: Appreciate general surgery management. Intra op Cx pending but several organisms on gram stain. Remains stable. Intra op Cx showing multiple organisms. Started on clears; cont IVF with NGT. Continue unasyn. Prn morphine per surgery. Will defer diet to surgery.    2. Prerenal RAMAN: His abdominal sx had been ongoing several days with vomiting and limited PO. Creatinine on presentation was 3.0, now down to 1.19.  Avoid nephrotoxins. Monitor UOP, appears adequate thus far.    3. Hypovolemic hyponatremia: resolved    4. Hypokalemia: start replacement per protocol    5. Diarrhea: c. Diff negative.     DVT Prophylaxis: Heparin SQ  Code Status: Full Code  Disposition: Expected discharge: will defer to surgery.  Goals prior to discharge include recover from large surgical intervention.   Family updated today: Yes      Interval History   Pt doing well. No new c/o.    -Data reviewed today: I reviewed all new labs and imaging results over the last 24 hours.     Physical Exam   Temp: 98.2  F (36.8  C) Temp src: Axillary BP: 137/82 Pulse: 93  Heart Rate: 100 Resp: 18 SpO2: 98 % O2 Device: None (Room air)    Vitals:    04/28/17 1300 05/02/17 0607   Weight: 85.3 kg (188 lb) 89.2 kg (196 lb 9.6 oz)     Vital Signs with Ranges  Temp:  [97.1  F (36.2  C)-100.4  F (38  C)] 98.2  F (36.8  C)  Pulse:  [93] 93  Heart Rate:  [] 100  Resp:  [18] 18  BP: (130-142)/(78-82) 137/82  SpO2:  [94 %-98 %] 98 %  I/O last 3 completed shifts:  In: 3329 [P.O.:620; I.V.:2709]  Out: 1206 [Urine:800; Drains:6; Stool:400]    GENERAL: No apparent distress. Awake, alert, and fully oriented.  HEENT: Normocephalic, atraumatic. Extraocular movements intact.  CARDIOVASCULAR: Regular rate and rhythm without murmurs or rubs. No S3.  PULMONARY: Clear bilaterally.  GASTROINTESTINAL: Soft, mild tenderness rLq, non-distended. Bowel sounds normoactive.   EXTREMITIES: No cyanosis or clubbing. No edema.  NEUROLOGICAL: CN 2-12 grossly intact, no focal neurological deficits.  DERMATOLOGICAL: No rash, ulcer, bruising, nor jaundice.     Medications     NaCl 100 mL/hr at 05/02/17 1033       acetaminophen  1,000 mg Oral Q8H     ranitidine  150 mg Oral BID     ampicillin-sulbactam (UNASYN) IV  3 g Intravenous Q6H     sodium chloride (PF)  3 mL Intracatheter Q8H     heparin  5,000 Units Subcutaneous Q8H     Data     Laboratory:    Recent Labs  Lab 05/02/17  0655 05/01/17  0624 04/30/17  0605 04/29/17  0616 04/28/17  1345   WBC  --   --  9.3 5.5 7.3   HGB 10.7* 11.0* 12.6* 14.4 16.2   HCT  --   --  37.7* 41.0 44.4   MCV  --   --  98 94 91   PLT  --  256 192 167 158       Recent Labs  Lab 05/02/17  0655 05/01/17  0624 04/30/17  0605    140 134   POTASSIUM 3.3* 3.5 3.6   CHLORIDE 107 106 100   CO2 27 26 30   ANIONGAP 9 8 4   GLC 99 97 122*   BUN 24 35* 51*   CR 1.19 1.40* 1.97*   GFRESTIMATED 70 58* 39*   GFRESTBLACK 85 70 47*   JEAN MARIE 7.7* 7.9* 7.3*       Recent Labs  Lab 04/30/17  0605 04/28/17  1345   AST 50* 51*   ALT 53 62   ALKPHOS 41 70   BILITOTAL 2.5* 2.6*       Recent Labs  Lab  04/28/17  1627   LACT 1.1       Recent Labs  Lab 04/28/17  1345   LIPASE 210       Recent Labs  Lab 04/28/17  2139   CULT Moderate growth Escherichia coliHeavy growth beta hemolytic  Streptococcus constellatusSusceptibility testing done on previous specimen*  Heavy growth Bacteroides ovatusHeavy growth Fusobacterium nucleatumHeavy growth Mixed aerobic and anaerobic floraSusceptibility testing not routinely done*  Moderate growth Escherichia coliHeavy growth beta hemolytic  Streptococcus constellatus*       Imaging:  No results found for this or any previous visit (from the past 24 hour(s)).      Robin Weaver MD  [facility] Hospitalist  [address] [place] [address]  05/02/2017

## 2017-05-02 NOTE — PLAN OF CARE
Problem: Goal Outcome Summary  Goal: Goal Outcome Summary  Outcome: Improving  Day RN  Vss, BS are faint, abd round and distended but improving. One liquid bm today sent for R/o ciff. Enteric isolation in place.   Pain in abd 2-3 declined need for prn morphine taking scheduled tylenol,  Tolerated clear liquids today no nausea   up indep and walking.abd binder in place when out of bed.  Dressing CDi change by surgeon today.

## 2017-05-03 LAB
CREAT SERPL-MCNC: 1.07 MG/DL (ref 0.66–1.25)
GFR SERPL CREATININE-BSD FRML MDRD: 79 ML/MIN/1.7M2
HGB BLD-MCNC: 10.8 G/DL (ref 13.3–17.7)
POTASSIUM SERPL-SCNC: 3.2 MMOL/L (ref 3.4–5.3)
POTASSIUM SERPL-SCNC: 3.7 MMOL/L (ref 3.4–5.3)

## 2017-05-03 PROCEDURE — 25000132 ZZH RX MED GY IP 250 OP 250 PS 637: Performed by: PHYSICIAN ASSISTANT

## 2017-05-03 PROCEDURE — 36415 COLL VENOUS BLD VENIPUNCTURE: CPT | Performed by: INTERNAL MEDICINE

## 2017-05-03 PROCEDURE — 82565 ASSAY OF CREATININE: CPT | Performed by: INTERNAL MEDICINE

## 2017-05-03 PROCEDURE — 85018 HEMOGLOBIN: CPT | Performed by: INTERNAL MEDICINE

## 2017-05-03 PROCEDURE — 84132 ASSAY OF SERUM POTASSIUM: CPT | Performed by: INTERNAL MEDICINE

## 2017-05-03 PROCEDURE — 99232 SBSQ HOSP IP/OBS MODERATE 35: CPT | Performed by: INTERNAL MEDICINE

## 2017-05-03 PROCEDURE — 25000125 ZZHC RX 250: Performed by: INTERNAL MEDICINE

## 2017-05-03 PROCEDURE — 12000011 ZZH R&B MS OVERFLOW

## 2017-05-03 PROCEDURE — 25000128 H RX IP 250 OP 636: Performed by: INTERNAL MEDICINE

## 2017-05-03 PROCEDURE — 25000132 ZZH RX MED GY IP 250 OP 250 PS 637: Performed by: HOSPITALIST

## 2017-05-03 PROCEDURE — 25000128 H RX IP 250 OP 636: Performed by: SURGERY

## 2017-05-03 RX ADMIN — ACETAMINOPHEN 1000 MG: 500 TABLET, FILM COATED ORAL at 02:44

## 2017-05-03 RX ADMIN — ACETAMINOPHEN 1000 MG: 500 TABLET, FILM COATED ORAL at 11:03

## 2017-05-03 RX ADMIN — HEPARIN SODIUM 5000 UNITS: 10000 INJECTION, SOLUTION INTRAVENOUS; SUBCUTANEOUS at 19:14

## 2017-05-03 RX ADMIN — POTASSIUM CHLORIDE 10 MEQ: 14.9 INJECTION, SOLUTION, CONCENTRATE PARENTERAL at 11:03

## 2017-05-03 RX ADMIN — ACETAMINOPHEN 1000 MG: 500 TABLET, FILM COATED ORAL at 19:15

## 2017-05-03 RX ADMIN — AMPICILLIN SODIUM AND SULBACTAM SODIUM 3 G: 2; 1 INJECTION, POWDER, FOR SOLUTION INTRAMUSCULAR; INTRAVENOUS at 06:05

## 2017-05-03 RX ADMIN — HEPARIN SODIUM 5000 UNITS: 10000 INJECTION, SOLUTION INTRAVENOUS; SUBCUTANEOUS at 02:44

## 2017-05-03 RX ADMIN — HEPARIN SODIUM 5000 UNITS: 10000 INJECTION, SOLUTION INTRAVENOUS; SUBCUTANEOUS at 09:42

## 2017-05-03 RX ADMIN — SODIUM CHLORIDE: 9 INJECTION, SOLUTION INTRAVENOUS at 23:49

## 2017-05-03 RX ADMIN — AMPICILLIN SODIUM AND SULBACTAM SODIUM 3 G: 2; 1 INJECTION, POWDER, FOR SOLUTION INTRAMUSCULAR; INTRAVENOUS at 13:00

## 2017-05-03 RX ADMIN — AMPICILLIN SODIUM AND SULBACTAM SODIUM 3 G: 2; 1 INJECTION, POWDER, FOR SOLUTION INTRAMUSCULAR; INTRAVENOUS at 19:15

## 2017-05-03 RX ADMIN — MORPHINE SULFATE 2 MG: 2 INJECTION, SOLUTION INTRAMUSCULAR; INTRAVENOUS at 02:49

## 2017-05-03 RX ADMIN — POTASSIUM CHLORIDE 10 MEQ: 14.9 INJECTION, SOLUTION, CONCENTRATE PARENTERAL at 14:23

## 2017-05-03 RX ADMIN — MORPHINE SULFATE 2 MG: 2 INJECTION, SOLUTION INTRAMUSCULAR; INTRAVENOUS at 23:49

## 2017-05-03 RX ADMIN — SODIUM CHLORIDE: 9 INJECTION, SOLUTION INTRAVENOUS at 04:53

## 2017-05-03 RX ADMIN — POTASSIUM CHLORIDE 10 MEQ: 14.9 INJECTION, SOLUTION, CONCENTRATE PARENTERAL at 15:42

## 2017-05-03 RX ADMIN — RANITIDINE HYDROCHLORIDE 150 MG: 150 TABLET, FILM COATED ORAL at 19:59

## 2017-05-03 RX ADMIN — POTASSIUM CHLORIDE 10 MEQ: 14.9 INJECTION, SOLUTION, CONCENTRATE PARENTERAL at 09:42

## 2017-05-03 RX ADMIN — RANITIDINE HYDROCHLORIDE 150 MG: 150 TABLET, FILM COATED ORAL at 09:38

## 2017-05-03 NOTE — PLAN OF CARE
Problem: Goal Outcome Summary  Goal: Goal Outcome Summary  Outcome: No Change  Afebrile. Scheduled tylenol and morphine X1 given for pain control. Abdomen distended, full and firm. Faint bowel sounds. LAMONTE drained 5 ml of serosanguinous drainage. Tolerated water overnight. Encourage ambulation today. Continue with IV fluids and antibiotics. Labs to be drawn this a.m. Will continue to monitor and provide for needs.

## 2017-05-03 NOTE — PLAN OF CARE
"Problem: Goal Outcome Summary  Goal: Goal Outcome Summary  Afebrile. Receiving scheduled Tylenol for abdominal pain with relief. Tolerating clear liquids. Abdomen rounded/distended and feels \"full.\" Tinkling bowel sounds. Passing gas per pt. Loose stool times three. Ambulating hallway. Potassium 3.2; receiving Potassium protocol. Showered. Continues to receive IV fluids and antibiotics. Will continue to monitor and provide for needs.       "

## 2017-05-03 NOTE — PLAN OF CARE
"Problem: Goal Outcome Summary  Goal: Goal Outcome Summary  Afebrile. Received Morphine times one for c/o abdominal pain with adequate relief. C/O nausea while Potassium was infusing; relieved with Peppermint Oil Aromatherapy. Tolerating clear liquids. LAMONTE drained 2mL serosanguineous drainage. Abdomen rounded/distended and feels \"full.\" Faint bowel sounds. Passing gas per pt. Loose stool times one. Ambulated hallway times two with encouragement. Potassium to be drawn @ 2100. Continues to receive IV fluids and antibiotics. Will continue to monitor and provide for needs.       "

## 2017-05-03 NOTE — PROGRESS NOTES
[facility]    Hospitalist Progress Note  Name: Jaspreet Whaley    MRN: [id number]  Provider:  Robin Weaver MD  Date of Service: 05/03/2017    Summary of Stay: Jaspreet Whaley is a 31 year old male with no medical hx admitted on 4/28/2017 with acute appendicitis requiring laparoscopy converted to laparotomy appendectomy.  Found to have acute perforated appendicitis with multiple abscesses, fecaliths, SBO, and peritonitis. Intra op Cx pending but several organisms on gram stain. Currently NPO, on IVF with NGT, dilaudid PCA and LAMONTE drain. In ED, also found to have prerenal RAMAN and hyponatremia to 125, both improving with IVF. Was given ertapenem x1 in ED, was on zosyn but transitioned to unasyn on 5/1/17 based on sensitivities.    Problem List:   1. Acute perforated appendicitis with multiple abscesses, fecaliths, SBO, and peritonitis s/p open laparotomy with appendectomy 4/28: Appreciate general surgery management. Intra op Cx pending but several organisms on gram stain. Remains stable. Intra op Cx showing multiple organisms. Started on clears; cont IVF with NGT. Continue unasyn. Prn morphine per surgery. Will defer diet to surgery.    2. Prerenal RAMAN: His abdominal sx had been ongoing several days with vomiting and limited PO. Creatinine on presentation was 3.0, now resolving. Avoid nephrotoxins. Monitor UOP, appears adequate thus far.     3. Hypovolemic hyponatremia: resolved    4. Hypokalemia: start replacement per protocol    5. Diarrhea: c. Diff negative.     DVT Prophylaxis: Heparin SQ  Code Status: Full Code  Disposition: Expected discharge: will defer to surgery.  Goals prior to discharge include recover from large surgical intervention.   Family updated today: Yes      Interval History   Pt doing well. No new c/o.    -Data reviewed today: I reviewed all new labs and imaging results over the last 24 hours.     Physical Exam   Temp: 98.5  F (36.9  C) Temp src: Oral BP: 144/87 Pulse: 90 Heart  Rate: 78 Resp: 18 SpO2: 95 % O2 Device: None (Room air)    Vitals:    04/28/17 1300 05/02/17 0607 05/03/17 0950   Weight: 85.3 kg (188 lb) 89.2 kg (196 lb 9.6 oz) 90.5 kg (199 lb 8.3 oz)     Vital Signs with Ranges  Temp:  [98.5  F (36.9  C)-98.8  F (37.1  C)] 98.5  F (36.9  C)  Pulse:  [90] 90  Heart Rate:  [78-81] 78  Resp:  [18-20] 18  BP: (144-149)/(87-89) 144/87  SpO2:  [95 %-97 %] 95 %  I/O last 3 completed shifts:  In: 4225.01 [P.O.:1580; I.V.:2645.01]  Out: 9 [Drains:9]    GENERAL: No apparent distress. Awake, alert, and fully oriented.  HEENT: Normocephalic, atraumatic. Extraocular movements intact.  CARDIOVASCULAR: Regular rate and rhythm without murmurs or rubs. No S3.  PULMONARY: Clear bilaterally.  GASTROINTESTINAL: Soft, mild tenderness rLq, non-distended. Bowel sounds normoactive.   EXTREMITIES: No cyanosis or clubbing. No edema.  NEUROLOGICAL: CN 2-12 grossly intact, no focal neurological deficits.  DERMATOLOGICAL: No rash, ulcer, bruising, nor jaundice.     Medications     NaCl 100 mL/hr at 05/03/17 0948       acetaminophen  1,000 mg Oral Q8H     ranitidine  150 mg Oral BID     ampicillin-sulbactam (UNASYN) IV  3 g Intravenous Q6H     sodium chloride (PF)  3 mL Intracatheter Q8H     heparin  5,000 Units Subcutaneous Q8H     Data     Laboratory:    Recent Labs  Lab 05/03/17  0700 05/02/17  0655 05/01/17  0624 04/30/17  0605 04/29/17  0616 04/28/17  1345   WBC  --   --   --  9.3 5.5 7.3   HGB 10.8* 10.7* 11.0* 12.6* 14.4 16.2   HCT  --   --   --  37.7* 41.0 44.4   MCV  --   --   --  98 94 91   PLT  --   --  256 192 167 158       Recent Labs  Lab 05/03/17  0700 05/02/17  1953 05/02/17  0655 05/01/17  0624 04/30/17  0605   NA  --   --  143 140 134   POTASSIUM 3.2* 3.4 3.3* 3.5 3.6   CHLORIDE  --   --  107 106 100   CO2  --   --  27 26 30   ANIONGAP  --   --  9 8 4   GLC  --   --  99 97 122*   BUN  --   --  24 35* 51*   CR 1.07  --  1.19 1.40* 1.97*   GFRESTIMATED 79  --  70 58* 39*   GFRESTBLACK  >90African American GFR Calc  --  85 70 47*   JEAN MARIE  --   --  7.7* 7.9* 7.3*       Recent Labs  Lab 04/30/17  0605 04/28/17  1345   AST 50* 51*   ALT 53 62   ALKPHOS 41 70   BILITOTAL 2.5* 2.6*       Recent Labs  Lab 04/28/17  1627   LACT 1.1       Recent Labs  Lab 04/28/17  1345   LIPASE 210       Recent Labs  Lab 04/28/17  2139   CULT Moderate growth Escherichia coliHeavy growth beta hemolytic  Streptococcus constellatusSusceptibility testing done on previous specimen*  Heavy growth Bacteroides ovatusHeavy growth Fusobacterium nucleatumHeavy growth Mixed aerobic and anaerobic floraSusceptibility testing not routinely done*  Moderate growth Escherichia coliHeavy growth beta hemolytic  Streptococcus constellatus*       Imaging:  No results found for this or any previous visit (from the past 24 hour(s)).      Robin Weaver MD  [facility] Hospitalist  [facility] [address] [facility] [place] [address]  05/03/2017

## 2017-05-03 NOTE — OP NOTE
-   DATE OF SERVICE: 4/28/2017     SURGEON  RANDALL BO MD     ASSISTANT   Taylor Chino PA-C and A physician assistant was medically necessary for his/her expertise in retraction/exposure, suctioning, and suturing.    PREPROCEDURE DIAGNOSIS   Acute appendicitis.    POSTPROCEDURE DIAGNOSIS   Acute appendicitis with perforation.  Small bowel obstruction.    PROCEDURE   Laparoscopic appendectomy converted to laparotomy.     ESTIMATED BLOOD LOSS   150.     COMPLICATIONS   None.     SPECIMENS   Abdominal abscess for culture, appendix.    FINDINGS  Perforated acute appendicitis with multiple abscesses and fecaliths. Small bowel obstruction. Base of appendix appeared viable and appendix was removed with stapler. Converted to open to run bowel. Serosal tear on antimesenteric portion of dilated bowel repaired with multiple interrupted silk sutures. Bleeding from small bowel mesentery in course of exteriorizing bowel controlled with sutures and nuknit. Drain left over raw area of mesentery.    INDICATIONS AND DESCRIPTION OF THE PROCEDURE   This is a 31-year-old male with 1 week of abdominal symptoms and CT showing perforated appendicitis with peritonitis as well as small bowel obstruction thought to be related. We discussed the recommendation for surgery versus antibiotics only. We discussed the risks, benefits, and alternatives. I also consulted by phone with my  Dr. Dodson who agreed with my recognition for surgery. Therefore, the patient was taken to the operating room after informed consent was obtained. He was placed in the supine position. Gen. anesthesia induced. The patient's operative site was prepped and draped in the usual sterile fashion. He was already on therapeutic antibiotics. We started by placing a supraumbilical Harry port after making a sharp incision the skin, dissecting down the fascia, dividing it, and placing 2-0 Vicryl stay sutures. We then placed 2 left abdominal 5 mm  trocars. Perforated acute appendicitis was seen. There was gross purulence which is in pockets along the right gutter as well as in the pelvis. We started by reflecting the omentum up over the liver. We took a sample of the abscess fluid for culture and suctioned away the rest quickly. We then dissected in the right lower quadrant. The appendix was grossly perforated and gangrenous in its mid and distal portions but the base appeared healthy and were able to relatively quickly divide the base and mesoappendix en bloc using an Endo TATO tan load stapler. We then attempted to run the bowel laparoscopically. The proximal bowel was quite distended and this proved impossible to do laparoscopically so we converted to hand-assisted laparoscopy using a GelPort and I placed an additional 5 mm epigastric trocar for the camera, but this still did not allow enough exposure to do this. We then removed the GelPort and enlarged incision to create a laparotomy in order to run the bowel. We manually broke up multiple fragile inflammatory adhesions and eviscerated the small bowel from its distal decompressed portion up to an area of distention. However, in the course of doing this I noted that there was a moderate sized serosal tear across the antimesenteric portion of approximately half the circumference in area of distended bowel. We repaired this using multiple interrupted 3-0 silk sutures. The mesentery closer to the transition area felt somewhat shortened it was difficult to fully eviscerate the bowel in this location. After doing so we noted that there had been a tear of the peritoneal surface on the mesentery and there were oozing vessels. These were controlled using multiple interrupted silk sutures with good effect. The bleeding was partially due to compression of the mesentery of the eviscerated bowel through the abdominal incision and it abated with the reduction of the bowel as well as suture control. I placed a sheet of  Nu-Knit here for further security. We then copiously irrigated the abdomen until returns were clear. Considering that we had already seen the area of transition of the caliber of the bowel and freed up this area completely, along with the difficulty encountered in attempting to run the bowel up to now, I elected not to further attempt eviscerate the bowel considering we already had evaluated the point of obvious obstruction. We then closed the fascia using looped 0 PDS ×2 after pulling down the omentum under the incision and placing multiple sheets of Seprafilm. I note also that we exteriorized a round drain through one of the previous 5 mm port sites with the tip left in the area of the bleeding mesenteric surface that had been controlled. The subcutaneous tissues were irrigated with skin loosely stapled and packing material was placed. This terminated the procedure. The patient appeared to tolerate this well without complications. All sponge and instrument counts were correct times two at the end of the procedure.    RANDALL BO MD    This note was created using voice recognition software. Undetected word substitutions or other errors may have occurred.

## 2017-05-03 NOTE — PROGRESS NOTES
[facility]   General Surgery Progress Note         Assessment and Plan:   Assessment:    -POD#5 s/p Laparoscopy converted to laparotomy, appendectomy, Lysis of adhesions. For perforated appendicitis and sbo.  -Ileus as expected - resolving slowly  -Loose BMs, C.Diff negative     Plan:    -Diet: remain on clears, slowly in small amounts; will reassess later today to see if ready to advance.    -Await decreased bloating and improved appetite prior to further diet advancement.  -Pain Mgmt: prn IV morphine, po acetaminophen -scheduled.  Plan to transition to PO narcotics when on full liquid diet.  -Increase activity, walking halls 3-4 times per day  -Pepcid for stress ulcer prophylaxis.  -Subcutaneous heparin for dvt prophylaxis.  -Antibiotics: Unasyn         Interval History:   Resting in bed, comfortable.  Continued loose BMs; C.Diff negative.  Still tolerating water intake and had one frozen ice item yesterday.  Still feels bloated.  Activity perhaps lower than patient reports; RN removed bedside urinal to increase walking and pt agrees to ambulate more today.  No recent nausea.         Physical Exam:   Blood pressure 146/89, pulse 93, temperature 98.8  F (37.1  C), temperature source Oral, resp. rate 20, height 1.829 m (6'), weight 89.2 kg (196 lb 9.6 oz), SpO2 95 %.    I/O last 3 completed shifts:  In: 3507.34 [P.O.:1100; I.V.:2407.34]  Out: 7 [Drains:7]    Abdomen: soft, distended, very hypoactive bowel sounds.  Incs -  Dressing in place, no shadowing.  LAMONTE scant serosang in bulb.            Data:     Recent Labs  Lab 05/03/17  0700 05/02/17  0655 05/01/17  0624 04/30/17  0605 04/29/17  0616 04/28/17  1345   WBC  --   --   --  9.3 5.5 7.3   HGB 10.8* 10.7* 11.0* 12.6* 14.4 16.2   HCT  --   --   --  37.7* 41.0 44.4   MCV  --   --   --  98 94 91   PLT  --   --  256 192 167 158       Trang Roper PA-C     Patient seen and examined. Agree with above. Feels less distended after multiple liquid BMs  but still not ready for diet advancement.    Jean Granger MD  [phone] (c)  [phone] (p)     This note was created using voice recognition software. Undetected word substitutions or other errors may have occurred.

## 2017-05-04 ENCOUNTER — APPOINTMENT (OUTPATIENT)
Dept: GENERAL RADIOLOGY | Facility: CLINIC | Age: 32
DRG: 339 | End: 2017-05-04
Attending: SURGERY
Payer: COMMERCIAL

## 2017-05-04 LAB
ANION GAP SERPL CALCULATED.3IONS-SCNC: 6 MMOL/L (ref 3–14)
BUN SERPL-MCNC: 10 MG/DL (ref 7–30)
CALCIUM SERPL-MCNC: 7.5 MG/DL (ref 8.5–10.1)
CHLORIDE SERPL-SCNC: 106 MMOL/L (ref 94–109)
CO2 SERPL-SCNC: 28 MMOL/L (ref 20–32)
CREAT SERPL-MCNC: 1.04 MG/DL (ref 0.66–1.25)
GFR SERPL CREATININE-BSD FRML MDRD: 82 ML/MIN/1.7M2
GLUCOSE SERPL-MCNC: 101 MG/DL (ref 70–99)
PLATELET # BLD AUTO: 303 10E9/L (ref 150–450)
POTASSIUM SERPL-SCNC: 3.4 MMOL/L (ref 3.4–5.3)
SODIUM SERPL-SCNC: 140 MMOL/L (ref 133–144)

## 2017-05-04 PROCEDURE — 12000011 ZZH R&B MS OVERFLOW

## 2017-05-04 PROCEDURE — 25000128 H RX IP 250 OP 636: Performed by: SURGERY

## 2017-05-04 PROCEDURE — 80048 BASIC METABOLIC PNL TOTAL CA: CPT | Performed by: INTERNAL MEDICINE

## 2017-05-04 PROCEDURE — 85049 AUTOMATED PLATELET COUNT: CPT | Performed by: INTERNAL MEDICINE

## 2017-05-04 PROCEDURE — 25000132 ZZH RX MED GY IP 250 OP 250 PS 637: Performed by: HOSPITALIST

## 2017-05-04 PROCEDURE — 25000132 ZZH RX MED GY IP 250 OP 250 PS 637: Performed by: PHYSICIAN ASSISTANT

## 2017-05-04 PROCEDURE — 25000125 ZZHC RX 250: Performed by: INTERNAL MEDICINE

## 2017-05-04 PROCEDURE — 36415 COLL VENOUS BLD VENIPUNCTURE: CPT | Performed by: INTERNAL MEDICINE

## 2017-05-04 PROCEDURE — 74020 XR ABDOMEN 2 VW: CPT

## 2017-05-04 PROCEDURE — 99232 SBSQ HOSP IP/OBS MODERATE 35: CPT | Performed by: INTERNAL MEDICINE

## 2017-05-04 PROCEDURE — 74250 X-RAY XM SM INT 1CNTRST STD: CPT

## 2017-05-04 RX ORDER — OXYCODONE HYDROCHLORIDE 5 MG/1
5-10 TABLET ORAL
Status: DISCONTINUED | OUTPATIENT
Start: 2017-05-04 | End: 2017-05-06 | Stop reason: HOSPADM

## 2017-05-04 RX ADMIN — HEPARIN SODIUM 5000 UNITS: 10000 INJECTION, SOLUTION INTRAVENOUS; SUBCUTANEOUS at 02:52

## 2017-05-04 RX ADMIN — AMPICILLIN SODIUM AND SULBACTAM SODIUM 3 G: 2; 1 INJECTION, POWDER, FOR SOLUTION INTRAMUSCULAR; INTRAVENOUS at 20:13

## 2017-05-04 RX ADMIN — DIATRIZOATE MEGLUMINE AND DIATRIZOATE SODIUM 240 ML: 660; 100 SOLUTION ORAL; RECTAL at 14:05

## 2017-05-04 RX ADMIN — RANITIDINE HYDROCHLORIDE 150 MG: 150 TABLET, FILM COATED ORAL at 20:17

## 2017-05-04 RX ADMIN — ACETAMINOPHEN 1000 MG: 500 TABLET, FILM COATED ORAL at 13:58

## 2017-05-04 RX ADMIN — HEPARIN SODIUM 5000 UNITS: 10000 INJECTION, SOLUTION INTRAVENOUS; SUBCUTANEOUS at 10:23

## 2017-05-04 RX ADMIN — OXYCODONE HYDROCHLORIDE 10 MG: 5 TABLET ORAL at 23:25

## 2017-05-04 RX ADMIN — AMPICILLIN SODIUM AND SULBACTAM SODIUM 3 G: 2; 1 INJECTION, POWDER, FOR SOLUTION INTRAMUSCULAR; INTRAVENOUS at 13:58

## 2017-05-04 RX ADMIN — SODIUM CHLORIDE: 9 INJECTION, SOLUTION INTRAVENOUS at 20:06

## 2017-05-04 RX ADMIN — HEPARIN SODIUM 5000 UNITS: 10000 INJECTION, SOLUTION INTRAVENOUS; SUBCUTANEOUS at 18:58

## 2017-05-04 RX ADMIN — AMPICILLIN SODIUM AND SULBACTAM SODIUM 3 G: 2; 1 INJECTION, POWDER, FOR SOLUTION INTRAMUSCULAR; INTRAVENOUS at 01:00

## 2017-05-04 RX ADMIN — AMPICILLIN SODIUM AND SULBACTAM SODIUM 3 G: 2; 1 INJECTION, POWDER, FOR SOLUTION INTRAMUSCULAR; INTRAVENOUS at 06:59

## 2017-05-04 RX ADMIN — RANITIDINE HYDROCHLORIDE 150 MG: 150 TABLET, FILM COATED ORAL at 08:10

## 2017-05-04 RX ADMIN — ACETAMINOPHEN 1000 MG: 500 TABLET, FILM COATED ORAL at 02:52

## 2017-05-04 RX ADMIN — ACETAMINOPHEN 1000 MG: 500 TABLET, FILM COATED ORAL at 22:13

## 2017-05-04 NOTE — PLAN OF CARE
Problem: Goal Outcome Summary  Goal: Goal Outcome Summary  Outcome: No Change  Afebrile. Scheduled tylenol and morphine X1 given for pain control. Abdomen distended, full and firm. Hypoactive, infreqent bowel sounds. LAMONTE C,D,I. Tolerating clear liquids. Encourage ambulation today. Continue with IV fluids and antibiotics. Will continue to monitor and provide for needs.

## 2017-05-04 NOTE — PROGRESS NOTES
[facility]    Hospitalist Progress Note  Name: Jaspreet Whaley    MRN: [id number]  Provider:  Robin Weaver MD  Date of Service: 05/04/2017    Summary of Stay: Jaspreet Whaley is a 31 year old male with no medical hx admitted on 4/28/2017 with acute appendicitis requiring laparoscopy converted to laparotomy appendectomy.  Found to have acute perforated appendicitis with multiple abscesses, fecaliths, SBO, and peritonitis. Intra op Cx pending but several organisms on gram stain. Currently clear liquids, on IVF with NGT, dilaudid PCA and LAMONTE drain. In ED, also found to have prerenal RAMAN and hyponatremia to 125, both improving with IVF. Was given ertapenem x1 in ED, was on zosyn but transitioned to unasyn on 5/1/17 based on sensitivities.    Problem List:   1. Acute perforated appendicitis with multiple abscesses, fecaliths, SBO, and peritonitis s/p open laparotomy with appendectomy 4/28: Appreciate general surgery management. Intra op Cx pending but several organisms on gram stain. Remains stable. Intra op Cx showing multiple organisms. Started on clears; cont IVF with NGT. Continue unasyn. Prn morphine.  Abdominal x-ray today suggests small bowel obstruction but could very well represent an ileus.  Also discussed with surgery.  We'll have repeat x-ray to see if contrast reaches the colon later.  Will defer diet to surgery.    2. Prerenal RAMAN: His abdominal sx had been ongoing several days with vomiting and limited PO. Creatinine on presentation was 3.0, now resolving and likely at baseline. Avoid nephrotoxins. UOP appears adequate thus far.     3. Hypovolemic hyponatremia: resolved    4. Hypokalemia: start replacement per protocol    5. Diarrhea: c. Diff negative.     DVT Prophylaxis: Heparin SQ  Code Status: Full Code  Disposition: Expected discharge: will defer to surgery.  Goals prior to discharge include recover from large surgical intervention.        Interval History   Pt doing well.  Patient  had three bowel movements this a.m. and appears more formed.  After drinking contrast for abdominal x-ray, he does feel a little bit nauseous.  Pain otherwise controlled.    -Data reviewed today: I reviewed all new labs and imaging results over the last 24 hours.     Physical Exam   Temp: 97.8  F (36.6  C) Temp src: Oral BP: 149/87 Pulse: 86   Resp: 18 SpO2: 98 % O2 Device: None (Room air)    Vitals:    04/28/17 1300 05/02/17 0607 05/03/17 0950   Weight: 85.3 kg (188 lb) 89.2 kg (196 lb 9.6 oz) 90.5 kg (199 lb 8.3 oz)     Vital Signs with Ranges  Temp:  [97.8  F (36.6  C)-98.5  F (36.9  C)] 97.8  F (36.6  C)  Pulse:  [86-90] 86  Resp:  [18] 18  BP: (144-149)/(78-87) 149/87  SpO2:  [98 %] 98 %  I/O last 3 completed shifts:  In: 2575.67 [P.O.:1200; I.V.:1375.67]  Out: 8 [Drains:8]    GENERAL: No apparent distress. Awake, alert, and fully oriented.  HEENT: Normocephalic, atraumatic. Extraocular movements intact.  CARDIOVASCULAR: Regular rate and rhythm without murmurs or rubs. No S3.  PULMONARY: Clear bilaterally.  GASTROINTESTINAL: Soft, mild tenderness rLq, non-distended. Bowel sounds normoactive.   EXTREMITIES: No cyanosis or clubbing. No edema.  NEUROLOGICAL: CN 2-12 grossly intact, no focal neurological deficits.  DERMATOLOGICAL: No rash, ulcer, bruising, nor jaundice.     Medications     NaCl 100 mL/hr at 05/03/17 2349       acetaminophen  1,000 mg Oral Q8H     ranitidine  150 mg Oral BID     ampicillin-sulbactam (UNASYN) IV  3 g Intravenous Q6H     sodium chloride (PF)  3 mL Intracatheter Q8H     heparin  5,000 Units Subcutaneous Q8H     Data     Laboratory:    Recent Labs  Lab 05/04/17  0640 05/03/17  0700 05/02/17  0655 05/01/17  0624 04/30/17  0605 04/29/17  0616 04/28/17  1345   WBC  --   --   --   --  9.3 5.5 7.3   HGB  --  10.8* 10.7* 11.0* 12.6* 14.4 16.2   HCT  --   --   --   --  37.7* 41.0 44.4   MCV  --   --   --   --  98 94 91     --   --  256 744 167 158       Recent Labs  Lab 05/04/17  0636  05/03/17  1900 05/03/17  0700  05/02/17  0655 05/01/17  0624     --   --   --  143 140   POTASSIUM 3.4 3.7 3.2*  < > 3.3* 3.5   CHLORIDE 106  --   --   --  107 106   CO2 28  --   --   --  27 26   ANIONGAP 6  --   --   --  9 8   *  --   --   --  99 97   BUN 10  --   --   --  24 35*   CR 1.04  --  1.07  --  1.19 1.40*   GFRESTIMATED 82  --  79  --  70 58*   GFRESTBLACK >90African American GFR Calc  --  >90African American GFR Calc  --  85 70   JEAN MARIE 7.5*  --   --   --  7.7* 7.9*   < > = values in this interval not displayed.    Recent Labs  Lab 04/30/17  0605 04/28/17  1345   AST 50* 51*   ALT 53 62   ALKPHOS 41 70   BILITOTAL 2.5* 2.6*       Recent Labs  Lab 04/28/17  1627   LACT 1.1       Recent Labs  Lab 04/28/17  1345   LIPASE 210       Recent Labs  Lab 04/28/17  2139   CULT Moderate growth Escherichia coliHeavy growth beta hemolytic  Streptococcus constellatusSusceptibility testing done on previous specimen*  Heavy growth Bacteroides ovatusHeavy growth Fusobacterium nucleatumHeavy growth Mixed aerobic and anaerobic floraSusceptibility testing not routinely done*  Moderate growth Escherichia coliHeavy growth beta hemolytic  Streptococcus constellatus*       Imaging:  Recent Results (from the past 24 hour(s))   XR Abdomen 2 Views    Narrative    XR ABDOMEN 2 VW 5/4/2017 9:00 AM    HISTORY: Laparotomy for small bowel obstruction from perforated  appendicitis.    COMPARISON: None.    FINDINGS: Dilated loops of small bowel with air-fluid levels on the  upright view consistent with small bowel obstruction. No evidence of  free intraperitoneal air.      Impression    IMPRESSION: Small bowel obstruction.    MD Robin BOOTH MD  [facility]  [address] [facility] [place] [address]  05/04/2017

## 2017-05-04 NOTE — PLAN OF CARE
Problem: Individualization  Goal: Patient Preferences  Outcome: Improving  Needs lots of encouragement to walk.  Faint bowel sounds.  Soft stool x2.

## 2017-05-04 NOTE — PROGRESS NOTES
"[facility]   General Surgery Progress Note         Assessment and Plan:   Assessment:    -POD#6 s/p Laparoscopy converted to laparotomy, appendectomy, Lysis of adhesions. For perforated appendicitis and sbo.  -Ileus as expected - resolving slowly  -Loose BMs, improving; C.Diff negative     Plan:    -Diet: ok to increase to full liquids, slowly in small amounts  -Pain Mgmt: transition to po oxycodone prn, po acetaminophen -scheduled  -Increase activity, walking every 2 hours  -Pepcid for stress ulcer prophylaxis.  -Subcutaneous heparin for dvt prophylaxis.  -Antibiotics: Unasyn         Interval History:   Resting in bed, comfortable.  BMs becoming slightly formed.  Tolerating intake of clear liquids and passing flatus.  Still feels \"full\" in general, minimal appetite.  Walking encouraged, reports 3 walks yesterday.  Discussed walking every 2 hours today.         Physical Exam:   Blood pressure 149/87, pulse 86, temperature 97.8  F (36.6  C), temperature source Oral, resp. rate 18, height 1.829 m (6'), weight 90.5 kg (199 lb 8.3 oz), SpO2 98 %.    I/O last 3 completed shifts:  In: 2575.67 [P.O.:1200; I.V.:1375.67]  Out: 8 [Drains:8]    Abdomen: soft, distended, hypoactive bowel sounds.  Incs -  Dressing in place, no shadowing.  LAMONTE scant serosang in bulb.            Data:     Recent Labs  Lab 05/04/17  0640 05/03/17  0700 05/02/17  0655 05/01/17  0624 04/30/17  0605 04/29/17  0616 04/28/17  1345   WBC  --   --   --   --  9.3 5.5 7.3   HGB  --  10.8* 10.7* 11.0* 12.6* 14.4 16.2   HCT  --   --   --   --  37.7* 41.0 44.4   MCV  --   --   --   --  98 94 91     --   --  256 192 167 158       Trang Roper PA-C     Patient seen and examined. Having BMs that are becoming somewhat more cohesive but still distended and not taking much PO. AXR with apparent SBO. SBFT in progress. If no obstruction will resume diet. If early postoperative SBO will plan conservative management.    Jean Granger, " MD  [phone] (c)  [phone] (p)     This note was created using voice recognition software. Undetected word substitutions or other errors may have occurred.

## 2017-05-04 NOTE — PLAN OF CARE
Problem: Goal Outcome Summary  Goal: Goal Outcome Summary  Outcome: No Change  Pt tolerating clear liquid diet in small amounts, ambulating in morris, continue to monitor and encourage ambulation.

## 2017-05-05 PROCEDURE — 12000011 ZZH R&B MS OVERFLOW

## 2017-05-05 PROCEDURE — 99232 SBSQ HOSP IP/OBS MODERATE 35: CPT | Performed by: INTERNAL MEDICINE

## 2017-05-05 PROCEDURE — 25000128 H RX IP 250 OP 636: Performed by: SURGERY

## 2017-05-05 PROCEDURE — 25000132 ZZH RX MED GY IP 250 OP 250 PS 637: Performed by: HOSPITALIST

## 2017-05-05 PROCEDURE — 25000125 ZZHC RX 250: Performed by: INTERNAL MEDICINE

## 2017-05-05 PROCEDURE — 25000132 ZZH RX MED GY IP 250 OP 250 PS 637: Performed by: PHYSICIAN ASSISTANT

## 2017-05-05 RX ADMIN — OXYCODONE HYDROCHLORIDE 5 MG: 5 TABLET ORAL at 18:57

## 2017-05-05 RX ADMIN — HEPARIN SODIUM 5000 UNITS: 10000 INJECTION, SOLUTION INTRAVENOUS; SUBCUTANEOUS at 11:18

## 2017-05-05 RX ADMIN — HEPARIN SODIUM 5000 UNITS: 10000 INJECTION, SOLUTION INTRAVENOUS; SUBCUTANEOUS at 02:40

## 2017-05-05 RX ADMIN — AMPICILLIN SODIUM AND SULBACTAM SODIUM 3 G: 2; 1 INJECTION, POWDER, FOR SOLUTION INTRAMUSCULAR; INTRAVENOUS at 02:42

## 2017-05-05 RX ADMIN — AMPICILLIN SODIUM AND SULBACTAM SODIUM 3 G: 2; 1 INJECTION, POWDER, FOR SOLUTION INTRAMUSCULAR; INTRAVENOUS at 20:02

## 2017-05-05 RX ADMIN — AMPICILLIN SODIUM AND SULBACTAM SODIUM 3 G: 2; 1 INJECTION, POWDER, FOR SOLUTION INTRAMUSCULAR; INTRAVENOUS at 09:11

## 2017-05-05 RX ADMIN — RANITIDINE HYDROCHLORIDE 150 MG: 150 TABLET, FILM COATED ORAL at 20:16

## 2017-05-05 RX ADMIN — AMPICILLIN SODIUM AND SULBACTAM SODIUM 3 G: 2; 1 INJECTION, POWDER, FOR SOLUTION INTRAMUSCULAR; INTRAVENOUS at 14:23

## 2017-05-05 RX ADMIN — RANITIDINE HYDROCHLORIDE 150 MG: 150 TABLET, FILM COATED ORAL at 09:35

## 2017-05-05 RX ADMIN — HEPARIN SODIUM 5000 UNITS: 10000 INJECTION, SOLUTION INTRAVENOUS; SUBCUTANEOUS at 18:37

## 2017-05-05 RX ADMIN — ACETAMINOPHEN 1000 MG: 500 TABLET, FILM COATED ORAL at 17:10

## 2017-05-05 RX ADMIN — ACETAMINOPHEN 1000 MG: 500 TABLET, FILM COATED ORAL at 09:14

## 2017-05-05 NOTE — PROGRESS NOTES
"[facility]   General Surgery Progress Note         Assessment and Plan:   Assessment:    -POD#7 s/p Laparoscopy converted to laparotomy, appendectomy, Lysis of adhesions. For perforated appendicitis and sbo.  -Ileus as expected - resolving slowly  -Loose BMs, improving; C.Diff negative     Plan:    -Diet: ok to increase to low residue, slowly in small amounts.  Add boost shakes.  -Pain Mgmt: transition to po oxycodone prn, po acetaminophen -scheduled  -Increase activity, walking every 2 hours  -Pepcid for stress ulcer prophylaxis.  -Subcutaneous heparin for dvt prophylaxis.  -Antibiotics: Unasyn  -Dispo: possible DC 1-2 days when improved nutrition intake.          Interval History:   Up in chair.  Had some nausea during SBFT yesterday, but 7-8 BMs afterwards.  Tolerating full liquids, but continues to feel \"full\".  Requesting fruit.  Activity level improving.           Physical Exam:   Blood pressure 134/76, pulse 86, temperature 98  F (36.7  C), temperature source Oral, resp. rate 16, height 1.829 m (6'), weight 90.5 kg (199 lb 8.3 oz), SpO2 97 %.    I/O last 3 completed shifts:  In: 2478.75 [P.O.:1135; I.V.:1343.75]  Out: 507.5 [Urine:500; Drains:7.5]    Abdomen: soft, distended, hypoactive bowel sounds.  Incs -  Dressing without drainage.  Staples in place.  No erythema  LAMONTE scant serosang in bulb, drain removed without issues.  Dressing placed.            Data:     Recent Labs  Lab 05/04/17  0640 05/03/17  0700 05/02/17  0655 05/01/17  0624 04/30/17  0605 04/29/17  0616 04/28/17  1345   WBC  --   --   --   --  9.3 5.5 7.3   HGB  --  10.8* 10.7* 11.0* 12.6* 14.4 16.2   HCT  --   --   --   --  37.7* 41.0 44.4   MCV  --   --   --   --  98 94 91     --   --  256 192 167 158       Trang Roper PA-C     Patient seen and examined. Agree with above. SBFT yesterday showed transit to colon in 2.5 hours. Multiple ongoing BMs and while bloated, he is steadily feeling less distention every day. " On low residue diet. Potentially could D/C home Sat or Sun.    Jean Granger MD  [phone] (c)  [phone] (p)     This note was created using voice recognition software. Undetected word substitutions or other errors may have occurred.

## 2017-05-05 NOTE — PROGRESS NOTES
[facility]  Hospitalist Progress Note    Domingo Rapp MD 05/05/2017  Text Page      Reason for Stay (Diagnosis): perforated appendicitis         Assessment and Plan:      Summary of Stay: Jaspreet Whaley is a 31 year old male with no medical hx admitted on 4/28/2017 with acute appendicitis requiring laparoscopy converted to laparotomy appendectomy. Found to have acute perforated appendicitis with multiple abscesses, fecaliths, SBO, and peritonitis. Intra op Cx shows strep species, e coli and anaerobes. Currently clear liquids, on IVF with NGT, dilaudid PCA and LAMONTE drain. In ED, also found to have prerenal RAMAN and hyponatremia to 125, both improving with IVF. Was given ertapenem x1 in ED, was on zosyn but transitioned to unasyn on 5/1/17 based on sensitivities.     Problem List:   1. Acute perforated appendicitis with multiple abscesses, fecaliths, SBO, and peritonitis s/p open laparotomy with appendectomy 4/28: Appreciate general surgery management. Intra op Cx shows anaerobes, e coli and strep sp.   Continue unasyn. Prn morphine.  Abdominal x-ray yesterday suggested small bowel obstruction or ileus.  Now having BMs and tolerating liquid diet.  BS decreased.   Advancing diet per surgery.   LAMONTE mgt per surgery- minimal output     2. Prerenal RAMAN: resolved    3. Hypovolemic hyponatremia: resolved     4. Hypokalemia: replacement per protocol     5. Diarrhea: c. Diff negative.      DVT Prophylaxis: Heparin SQ  Code Status: Full Code  Disposition:  Likely 1-2 days if tolerates diet and no signs of infection.              Interval History (Subjective):      Feels well.  Tolerating liquid diet, pain resolving.                    Physical Exam:      Last Vital Signs:  /66  Pulse 86  Temp 98  F (36.7  C) (Oral)  Resp 16  Ht 1.829 m (6')  Wt 90.5 kg (199 lb 8.3 oz)  SpO2 97%  BMI 27.06 kg/m2      Vital signs reviewed  General:  Alert, calm, NAD  CV: regular rate and rhythm, no murmurs or  rubs  Lungs:  Clear to ascultation bilaterally, normal respiratory effort  Abdomen:  Soft, nontender, nondistended, no masses, decreased bowel sounds, LAMONTE in place.   Extremities:  No edema  Neuro: normal strength and sensation in all 4 extremities, cranial nerves grossly intact  Psychiatric:  Mood and affect within normal limits           Medications:      All current medications were reviewed with changes reflected in problem list.         Data:      All new lab and imaging data was reviewed.

## 2017-05-05 NOTE — PROGRESS NOTES
"CLINICAL NUTRITION SERVICES  -  ASSESSMENT NOTE    REASON FOR ASSESSMENT  Jaspreet Whaley is a 31 year old male seen by the dietitian for [facility]    NUTRITION HISTORY  - Information obtained from patient  - Patient is on a regular diet at home. Poor PO for 1 week PTA with nausea, vomiting, diarrhea and abdominal pain otherwise no recent changes to intake or appetite.  - Food allergies/intolerances: NKFA    CURRENT NUTRITION ORDERS  - Diet: Low residue, six small feedings  - Supplement: Ensure BID added 5/5  Current Intake/Tolerance:  - Per flow sheet review, 100% intake for one documented meal while on full liquids. Small amounts this AM with good tolerance  - Factors affecting nutrition intake include: altered GI function post op, resolving SBO    PHYSICAL FINDINGS  Observed  Appears well nourished, clavicle mildly pronounced but difficult to assess vs baseline    Obtained from Chart/Interdisciplinary Team  BM: x7 in last 24 hours    ANTHROPOMETRICS  Height: 6' 0\"  Weight: 90.5 kg   Body mass index is 27.06 kg/(m^2).  Weight Status:  Overweight BMI 25-29.9  IBW: kg +/- 10%  % IBW:    Weight History:  Up 5 kg since admit  Vitals:    04/28/17 1300 05/02/17 0607 05/03/17 0950   Weight: 85.3 kg (188 lb) 89.2 kg (196 lb 9.6 oz) 90.5 kg (199 lb 8.3 oz)     Wt Readings from Last 10 Encounters:   05/03/17 90.5 kg (199 lb 8.3 oz)       LABS  Labs reviewed  + ketones 4/28    MEDICATIONS  Medications reviewed, IVF at 75 mL/hr    PROCEDURES WITH NUTRITIONAL IMPLICATIONS  SBFT --> 2.5 hours to reach colon    ASSESSED NUTRITION NEEDS (PER APPROVED PRACTICE GUIDELINES, Dosing weight: 90.5 kg):  Estimated Energy Needs: 3850-1500 kcals (25-30 Kcal/Kg)  Justification: maintenance  Estimated Protein Needs:  grams protein (1-1.2 g pro/Kg)  Justification: hypercatabolism with acute illness  Estimated Fluid Needs: >1 mL/Kcal  Justification: maintenance    MALNUTRITION:  % Weight Loss:None noted  % Intake:</= 50% for >/= 5 days " (severe malnutrition)  Subcutaneous Fat Loss:None observed  Muscle Loss:None observed - see note above  Fluid Retention:None noted    Malnutrition Diagnosis: Patient does not meet two of the above criteria necessary for diagnosing malnutrition     NUTRITION DIAGNOSIS:  Inadequate protein-energy intake related to altered GI function as evidenced by meeting <50% of estimated needs for >7 days since admit and 5 days PTA with SBO and perforated appendicitis     INTERVENTIONS  Recommendations / Nutrition Prescription  Diet per MD discretion + oral nutrition supplements to increase calorie and protein intake    Implementation  Nutrition education: Provided fast facts handout for low fiber/residue diet. Discussed diet liberalization per MD and offered further low residue diet education if restrictions warranted at d/c  Medical food supplement: Ensure prn, patient only wants one per day and has extra in room    Goals  Patient to consume >/= 50% of meals TID and >1 high protein supplement per day with good tolerance    MONITORING AND EVALUATION:  Food, fluid and supplement intake - Monitor for adequacy/tolerance  Progress towards goals will be monitored and evaluated per protocol and Practice Guidelines      CY Patterson  3rd floor/ICU: [phone]  All other floors: [phone]  Weekend/holiday: [phone]  Office: [phone]

## 2017-05-05 NOTE — PLAN OF CARE
Problem: Goal Outcome Summary  Goal: Goal Outcome Summary  Outcome: No Change  VSS.  Pt requested one dose of pain medication for abdominal pain 3/10.  Pt was able to sleep following administration and declined any further doses other than scheduled Tylenol.  Voiding independently.  Tolerated pudding.  Ambulating in room; encouraged ambulation in halls.  Dressing C/D/I.  Drain patent and secure with minimal output.  Receiving IV unasyn.

## 2017-05-05 NOTE — PLAN OF CARE
Problem: Goal Outcome Summary  Goal: Goal Outcome Summary  Outcome: Improving  Diet advanced to low residue. Eating small amounts at a time. Has had one Ensure supplement. Will attempt to drink one more today. No nausea or increase in pain. Patient up ad ld, ambulating around unit frequently. Pain well controlled with tylenol. LAMONTE drain pulled per surgery. Patient showered and had dressings reapplies afterward.Encourage activity and PO intake as tolerated.

## 2017-05-05 NOTE — PLAN OF CARE
"Problem: Goal Outcome Summary  Goal: Goal Outcome Summary  Outcome: Improving  Afebrile. Patient denies need for additional pain medication and reports pain at a \"comfortable level\". Scheduled Tylenol continued. Hypoactive BS. LAMONTE putting out serosanguinous drainage. Dressings C/D/I. Abdomen rounded and distended. Patient denies abdominal tenderness. Voiding. Frequent loose, watery stools per patient that began after having oral contrast this afternoon. Passing gas. Tolerating full liquid diet. Patient up walking hallways 4 times throughout shift. IV antibiotics continued. Will continue to monitor and provide for cares.       "

## 2017-05-06 VITALS
BODY MASS INDEX: 27.02 KG/M2 | HEART RATE: 91 BPM | RESPIRATION RATE: 16 BRPM | TEMPERATURE: 97.9 F | WEIGHT: 199.52 LBS | DIASTOLIC BLOOD PRESSURE: 93 MMHG | HEIGHT: 72 IN | SYSTOLIC BLOOD PRESSURE: 141 MMHG | OXYGEN SATURATION: 97 %

## 2017-05-06 PROCEDURE — 25000125 ZZHC RX 250: Performed by: INTERNAL MEDICINE

## 2017-05-06 PROCEDURE — 25000132 ZZH RX MED GY IP 250 OP 250 PS 637: Performed by: PHYSICIAN ASSISTANT

## 2017-05-06 PROCEDURE — 25000128 H RX IP 250 OP 636: Performed by: SURGERY

## 2017-05-06 PROCEDURE — 25000132 ZZH RX MED GY IP 250 OP 250 PS 637: Performed by: HOSPITALIST

## 2017-05-06 PROCEDURE — 99238 HOSP IP/OBS DSCHRG MGMT 30/<: CPT | Performed by: INTERNAL MEDICINE

## 2017-05-06 RX ORDER — OXYCODONE HYDROCHLORIDE 5 MG/1
5-10 TABLET ORAL EVERY 6 HOURS PRN
Qty: 30 TABLET | Refills: 0 | Status: SHIPPED | OUTPATIENT
Start: 2017-05-06

## 2017-05-06 RX ADMIN — RANITIDINE HYDROCHLORIDE 150 MG: 150 TABLET, FILM COATED ORAL at 07:59

## 2017-05-06 RX ADMIN — HEPARIN SODIUM 5000 UNITS: 10000 INJECTION, SOLUTION INTRAVENOUS; SUBCUTANEOUS at 01:33

## 2017-05-06 RX ADMIN — OXYCODONE HYDROCHLORIDE 10 MG: 5 TABLET ORAL at 00:20

## 2017-05-06 RX ADMIN — AMPICILLIN SODIUM AND SULBACTAM SODIUM 3 G: 2; 1 INJECTION, POWDER, FOR SOLUTION INTRAMUSCULAR; INTRAVENOUS at 01:33

## 2017-05-06 RX ADMIN — ACETAMINOPHEN 1000 MG: 500 TABLET, FILM COATED ORAL at 09:55

## 2017-05-06 RX ADMIN — ACETAMINOPHEN 1000 MG: 500 TABLET, FILM COATED ORAL at 01:32

## 2017-05-06 RX ADMIN — AMPICILLIN SODIUM AND SULBACTAM SODIUM 3 G: 2; 1 INJECTION, POWDER, FOR SOLUTION INTRAMUSCULAR; INTRAVENOUS at 07:59

## 2017-05-06 NOTE — PLAN OF CARE
Problem: Goal Outcome Summary  Goal: Goal Outcome Summary  Outcome: Improving  Afebrile, VSS, lungs clear, encouraged to use inspirometer. Pt up ad ld frequently in halls and in room. Abdomen distended slightly firm, hypoactive bowel sounds although states is passing gas and has no increase in abdominal pain with eating solids. Taking small amts of low residue diet. Abdominal dressings dry and intact. Abdominal binder on . IV saline locked between antibiotics. Cooperative with cares.

## 2017-05-06 NOTE — PLAN OF CARE
Problem: Goal Outcome Summary  Goal: Goal Outcome Summary  Outcome: Improving  Oxycodone given for abdominal discomfort rated 4 and decreased to 2. Bowel sounds active in all quadrants. Abdomen remains distended. Has 3 loose brown stools on evening shift. Tolerated water. Dressings dry and intact. Encouraged to use IS. IV saline locked.

## 2017-05-06 NOTE — PLAN OF CARE
Problem: Individualization  Goal: Patient Preferences  Outcome: Improving  Had 2 loose stools.  Audible  bowel sounds.  Tolerating a low fiber diet.  States he feels like he is getting better and not feeling bloated.

## 2017-05-06 NOTE — PROGRESS NOTES
[facility]   General Surgery Progress Note         Assessment and Plan:   Assessment:    -POD#8 s/p Laparoscopy converted to laparotomy, appendectomy, Lysis of adhesions. For perforated appendicitis and sbo.  -Ileus as expected - resolved     Plan:    -Diet: low residue.  Add boost shakes.  -Pain Mgmt:  oxycodone prn, po acetaminophen -scheduled  -Subcutaneous heparin for dvt prophylaxis.  -Antibiotics: Unasyn  -Dispo: Dc this morning if approved by hospitalist  -Rx: oxycodone, 3 days of augmentin\  -RTC: Staples out early next week, 1-3 weeks for regular po visit  -DC instructions reviewed         Interval History:   Resting in bed, doing well. Has some incisional soreness as expected. Getting oxycodone at night. Tylenol during the day controls pain. Up walking, voiding, tolerating diet and having loose BMs. Feels ready to go home.          Physical Exam:   Blood pressure (!) 141/93, pulse 91, temperature 97.9  F (36.6  C), temperature source Oral, resp. rate 16, height 1.829 m (6'), weight 90.5 kg (199 lb 8.3 oz), SpO2 97 %.    I/O last 3 completed shifts:  In: 2552.5 [P.O.:1520; I.V.:1032.5]  Out: -     Abdomen: soft, mildly distended, +bowel sounds.  Incs -  Dressing without drainage.  Staples in place.  No erythema          Data:     Recent Labs  Lab 05/04/17  0640 05/03/17  0700 05/02/17  0655 05/01/17  0624 04/30/17  0605   WBC  --   --   --   --  9.3   HGB  --  10.8* 10.7* 11.0* 12.6*   HCT  --   --   --   --  37.7*   MCV  --   --   --   --  98     --   --  256 192       Sridhar Prather PA-C     Patient seen and examined. Agree with above.    Jean Granger MD  [phone] (c)  [phone] (p)     This note was created using voice recognition software. Undetected word substitutions or other errors may have occurred.

## 2017-05-06 NOTE — PLAN OF CARE
Problem: Individualization  Goal: Patient Preferences  Outcome: Adequate for Discharge Date Met:  05/06/17  Anxious to go home.  Went over discharge with Israel and his mom.  PA and Doctor Rory came to see patient.

## 2017-05-07 NOTE — DISCHARGE SUMMARY
PRIMARY CARE PHYSICIAN:  Dr. Sam Serrano.        SURGEON:  Dr. Jean Granger.       DATE OF ADMISSION:  04/28/2017.       DATE OF DISCHARGE:  05/06/2017.        DISPOSITION:  To home.      DIAGNOSES:   1.  Acute perforated appendicitis with peritonitis, multiple abscesses and small bowel obstruction.   2.  Prerenal acute kidney injury.   3.  Hyponatremia.   4.  Dehydration.      PROCEDURES:   1.  CT abdomen and pelvis which showed prominent appendicolith at the base of the appendix and the appendix appears thickened, fluid collection in the right lower quadrant suspicious for abscess, multiple prominent dilated loops of small bowel consistent with small-bowel obstruction.   2.  General Surgery consultation.   3.  Appendectomy converted to laparotomy.      ALLERGIES:  No known drug allergies.      PENDING TEST RESULTS:  None.      PRIMARY CARE PROVIDER:  Dr. Sam Serrano.      HOSPITAL COURSE:  Acute perforated appendicitis with peritonitis.  Mr. Jaspreet Whaley is a 31-year-old man who presented with 5 days of nausea, vomiting and abdominal pain.  Imaging revealed evidence of appendicitis with perforation.  He underwent urgent appendectomy and this diagnosis was confirmed.  He was found to have abscesses and had a thorough washout.  He tolerated the procedure well and postoperatively did not show any signs of ongoing infection.  He was treated with Unasyn.  His diet was advanced, which he tolerated well.  He was discharged in stable condition and will complete a course of antibiotics and follow up with Dr. Granger in clinic.  Intraoperative culture showed anaerobes, E. coli and strep species, all of which should be covered by Unasyn.      DISCHARGE MEDICATIONS:   1.  Oxycodone 5-10 mg every 6 hours as needed for pain.   2.  Augmentin 875 mg twice a day for 3 days.         EMERY SANDOVAL MD             D: 05/07/2017 07:01   T: 05/07/2017 09:16   MT: [id number]      Name:     JASPREET WHALEY   MRN:       [id number]        Account:        [id number]   :      1985           Admit Date:     [id number]                                  Discharge Date: 2017      Document: [id number]       cc: Sam Granger MD

## 2017-05-09 ENCOUNTER — OFFICE VISIT (OUTPATIENT)
Dept: SURGERY | Facility: CLINIC | Age: 32
End: 2017-05-09
Payer: COMMERCIAL

## 2017-05-09 VITALS
WEIGHT: 183 LBS | DIASTOLIC BLOOD PRESSURE: 74 MMHG | BODY MASS INDEX: 24.79 KG/M2 | OXYGEN SATURATION: 97 % | TEMPERATURE: 98.3 F | HEIGHT: 72 IN | SYSTOLIC BLOOD PRESSURE: 116 MMHG | HEART RATE: 102 BPM

## 2017-05-09 DIAGNOSIS — Z09 SURGICAL FOLLOWUP VISIT: Primary | ICD-10-CM

## 2017-05-09 PROCEDURE — 99024 POSTOP FOLLOW-UP VISIT: CPT | Performed by: SURGERY

## 2017-05-09 NOTE — LETTER
May 9, 2017      RE:  Jaspreet Whaley-:  [birth year]    Jaspreet Whaley presents in follow-up. He had a laparoscopic appendectomy converted to laparotomy approximately 2 weeks ago for perforated appendicitis causing small bowel obstruction. He states that he is recovering. He states his bowels are moving. He states he is progressively feeling less bloated. He is eating regular food without difficulty. There are no fevers or chills. Incisional pain is minimal. On exam, the wounds are well healing without any signs of infection or hernia. Staples are removed. He is again advised of his postoperative restrictions. I will see the patient back as needed.     Jean Granger MD  [phone] (c)  [phone] (p)      This note was created using voice recognition software. Undetected word substitutions or other errors may have occurred.

## 2017-05-09 NOTE — MR AVS SNAPSHOT
"              After Visit Summary   5/9/2017    Jaspreet Whaley    MRN: [id number]           Patient Information     Date Of Birth          1985        Visit Information        Provider Department      5/9/2017 3:00 PM Jean Granger MD [facility] General Surgery      Today's Diagnoses     Surgical followup visit    -  1       Follow-ups after your visit        Your next 10 appointments already scheduled     May 19, 2017 10:00 AM CDT   Post Op with Jean Granger MD   [facility] ([facility])    [facility] [address]  [facility] [id number] [phone]              Who to contact     If you have questions or need follow up information about today's clinic visit or your schedule please contact [facility] directly at [phone].  Normal or non-critical lab and imaging results will be communicated to you by MyChart, letter or phone within 4 business days after the clinic has received the results. If you do not hear from us within 7 days, please contact the clinic through MyChart or phone. If you have a critical or abnormal lab result, we will notify you by phone as soon as possible.  Submit refill requests through Ping4 or call your pharmacy and they will forward the refill request to us. Please allow 3 business days for your refill to be completed.          Additional Information About Your Visit        MyChart Information     Ping4 lets you send messages to your doctor, view your test results, renew your prescriptions, schedule appointments and more. To sign up, go to [email or web address] . Click on \"Log in\" on the left side of the screen, which will take you to the Welcome page. Then click on \"Sign up Now\" on the right side of the page.     You will be asked to enter the access code listed below, as well as some personal information. Please follow the " directions to create your username and password.     Your access code is: [email or web address]  Expires: 2017  4:48 PM     Your access code will  in 90 days. If you need help or a new code, please call your [facility] clinic or [phone].        Care EveryWhere ID     This is your Care EveryWhere ID. This could be used by other organizations to access your [facility] medical records  [id number]        Your Vitals Were     Pulse Temperature Height Pulse Oximetry BMI (Body Mass Index)       102 98.3  F (36.8  C) (Oral) 6' (1.829 m) 97% 24.82 kg/m2        Blood Pressure from Last 3 Encounters:   17 116/74   17 (!) 141/93    Weight from Last 3 Encounters:   17 183 lb (83 kg)   17 199 lb 8.3 oz (90.5 kg)              Today, you had the following     No orders found for display       Primary Care Provider Office Phone # Fax #    Sam ZAIDI Zach [phone]       [facility] [place] [address] [address] [facility] [address]        Thank you!     Thank you for choosing [facility]  for your care. Our goal is always to provide you with excellent care. Hearing back from our patients is one way we can continue to improve our services. Please take a few minutes to complete the written survey that you may receive in the mail after your visit with us. Thank you!             Your Updated Medication List - Protect others around you: Learn how to safely use, store and throw away your medicines at www.disposemymeds.org.          This list is accurate as of: 17 11:59 PM.  Always use your most recent med list.                   Brand Name Dispense Instructions for use    amoxicillin-clavulanate 875-125 MG per tablet    AUGMENTIN    6 tablet    Take 1 tablet by mouth 2 times daily for 3 days       oxyCODONE 5 MG IR tablet    ROXICODONE    30 tablet    Take 1-2 tablets (5-10 mg) by mouth every 6 hours as needed for severe pain

## 2017-05-10 NOTE — PROGRESS NOTES
Jaspreet Ku Judd presents in follow-up. He had a laparoscopic appendectomy converted to laparotomy approximately 2 weeks ago for perforated appendicitis causing small bowel obstruction. He states that he is recovering. He states his bowels are moving. He states he is progressively feeling less bloated. He is eating regular food without difficulty. There are no fevers or chills. Incisional pain is minimal. On exam, the wounds are well healing without any signs of infection or hernia. Staples are removed. He is again advised of his postoperative restrictions. I will see the patient back as needed.    Jean Granger MD  [phone] (c)  [phone] (p)     This note was created using voice recognition software. Undetected word substitutions or other errors may have occurred.

## 2017-05-19 ENCOUNTER — OFFICE VISIT (OUTPATIENT)
Dept: SURGERY | Facility: CLINIC | Age: 32
End: 2017-05-19
Payer: COMMERCIAL

## 2017-05-19 VITALS — BODY MASS INDEX: 24.79 KG/M2 | HEIGHT: 72 IN | TEMPERATURE: 98.2 F | WEIGHT: 183 LBS

## 2017-05-19 DIAGNOSIS — Z09 SURGICAL FOLLOWUP VISIT: Primary | ICD-10-CM

## 2017-05-19 PROCEDURE — 99024 POSTOP FOLLOW-UP VISIT: CPT | Performed by: SURGERY

## 2017-05-19 NOTE — MR AVS SNAPSHOT
"              After Visit Summary   2017    Jaspreet Whaley    MRN: [id number]           Patient Information     Date Of Birth          1985        Visit Information        Provider Department      2017 10:00 AM Jean Granger MD [facility] General Surgery      Today's Diagnoses     Surgical followup visit    -  1       Follow-ups after your visit        Who to contact     If you have questions or need follow up information about today's clinic visit or your schedule please contact [facility] directly at [phone].  Normal or non-critical lab and imaging results will be communicated to you by [employer], letter or phone within 4 business days after the clinic has received the results. If you do not hear from us within 7 days, please contact the clinic through [employer] or phone. If you have a critical or abnormal lab result, we will notify you by phone as soon as possible.  Submit refill requests through [email or web address] or call your pharmacy and they will forward the refill request to us. Please allow 3 business days for your refill to be completed.          Additional Information About Your Visit        MyChart Information     [email or web address] lets you send messages to your doctor, view your test results, renew your prescriptions, schedule appointments and more. To sign up, go to [email or web address] . Click on \"Log in\" on the left side of the screen, which will take you to the Welcome page. Then click on \"Sign up Now\" on the right side of the page.     You will be asked to enter the access code listed below, as well as some personal information. Please follow the directions to create your username and password.     Your access code is: [email or web address]  Expires: 2017  4:48 PM     Your access code will  in 90 days. If you need help or a new code, please call your [facility] clinic or [phone].        Care EveryWhere ID     " This is your Care EveryWhere ID. This could be used by other organizations to access your [facility] medical records  [id number]        Your Vitals Were     Temperature Height BMI (Body Mass Index)             98.2  F (36.8  C) (Oral) 6' (1.829 m) 24.82 kg/m2          Blood Pressure from Last 3 Encounters:   05/09/17 116/74   05/06/17 (!) 141/93    Weight from Last 3 Encounters:   05/19/17 183 lb (83 kg)   05/09/17 183 lb (83 kg)   05/03/17 199 lb 8.3 oz (90.5 kg)              Today, you had the following     No orders found for display       Primary Care Provider Office Phone # Fax #    Sam ZAIDI Zach [phone]       [facility] [place] [address] [address]  [facility] [address]        Thank you!     Thank you for choosing [facility]  for your care. Our goal is always to provide you with excellent care. Hearing back from our patients is one way we can continue to improve our services. Please take a few minutes to complete the written survey that you may receive in the mail after your visit with us. Thank you!             Your Updated Medication List - Protect others around you: Learn how to safely use, store and throw away your medicines at www.disposemymeds.org.          This list is accurate as of: 5/19/17 11:59 PM.  Always use your most recent med list.                   Brand Name Dispense Instructions for use    oxyCODONE 5 MG IR tablet    ROXICODONE    30 tablet    Take 1-2 tablets (5-10 mg) by mouth every 6 hours as needed for severe pain

## 2017-05-19 NOTE — LETTER
5/19/2017     Jaspreet Whaley   1985   [address] [facility] [address]     To whom it may concern:    Jaspreet Whaley had a medical treatment on 4/28/2017. He may return to work on 5/19/2017 with the following restrictions: light duty with no lifting over 20 lbs. Please call the number above if there are any questions.    Thank you,        Jean Granger MD

## 2017-05-19 NOTE — LETTER
May 19, 2017    RE:  Jaspreet Whaley-:  [birth year]    Jaspreet Whaley is presenting in follow-up. Approximately 3 weeks ago he underwent an emergency laparoscopic appendectomy converted to laparotomy to lyse adhesions related to a small bowel obstruction caused by the perforated appendicitis. He recovered well and has been discharged in the hospital. Very recently noted there was some serous drainage from the incision. There were no fevers or chills. There was nothing that he describes grossly purulent. On exam, the incision appears be healing. There is no evidence of hernia. There is no cellulitis. There is a small amount of serous drainage on the bandage and the midportion of the incision appears to have  slightly at the skin level. I would like him to keep this covered temporarily until the drainage subsides. I suspect that what happened was that there was a small seroma which has spontaneously drained. I do not see evidence of infection. The patient may follow-up as needed.     Jean Granger MD  [phone] (c)  [phone] (p)      This note was created using voice recognition software. Undetected word substitutions or other errors may have occurred.

## 2017-05-30 NOTE — PROGRESS NOTES
Jaspreet Whaley is presenting in follow-up. Approximately 3 weeks ago he underwent an emergency laparoscopic appendectomy converted to laparotomy to lyse adhesions related to a small bowel obstruction caused by the perforated appendicitis. He recovered well and has been discharged in the hospital. Very recently noted there was some serous drainage from the incision. There were no fevers or chills. There was nothing that he describes grossly purulent. On exam, the incision appears be healing. There is no evidence of hernia. There is no cellulitis. There is a small amount of serous drainage on the bandage and the midportion of the incision appears to have  slightly at the skin level. I would like him to keep this covered temporarily until the drainage subsides. I suspect that what happened was that there was a small seroma which has spontaneously drained. I do not see evidence of infection. The patient may follow-up as needed.    Jean Granger MD  [phone] (c)  [phone] (p)     This note was created using voice recognition software. Undetected word substitutions or other errors may have occurred.

## 2023-11-29 ENCOUNTER — HOSPITAL ENCOUNTER (EMERGENCY)
Facility: CLINIC | Age: 38
Discharge: HOME | End: 2023-11-29
Attending: STUDENT IN AN ORGANIZED HEALTH CARE EDUCATION/TRAINING PROGRAM | Admitting: STUDENT IN AN ORGANIZED HEALTH CARE EDUCATION/TRAINING PROGRAM
Payer: COMMERCIAL

## 2023-11-29 VITALS
DIASTOLIC BLOOD PRESSURE: 92 MMHG | WEIGHT: 190 LBS | BODY MASS INDEX: 25.18 KG/M2 | SYSTOLIC BLOOD PRESSURE: 128 MMHG | HEIGHT: 73 IN | HEART RATE: 108 BPM | TEMPERATURE: 98.4 F | RESPIRATION RATE: 16 BRPM | OXYGEN SATURATION: 96 %
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
## 2023-11-29 DIAGNOSIS — K56.699 FOOD BOLUS OBSTRUCTION OF INTESTINE (H): ICD-10-CM

## 2023-11-29 LAB — UPPER GI ENDOSCOPY: NORMAL

## 2023-11-29 PROCEDURE — 96374 THER/PROPH/DIAG INJ IV PUSH: CPT

## 2023-11-29 PROCEDURE — 43255 EGD CONTROL BLEEDING ANY: CPT | Mod: XU | Performed by: INTERNAL MEDICINE

## 2023-11-29 PROCEDURE — 43239 EGD BIOPSY SINGLE/MULTIPLE: CPT | Performed by: INTERNAL MEDICINE

## 2023-11-29 PROCEDURE — 43247 EGD REMOVE FOREIGN BODY: CPT | Performed by: INTERNAL MEDICINE

## 2023-11-29 PROCEDURE — 99284 EMERGENCY DEPT VISIT MOD MDM: CPT | Mod: 25

## 2023-11-29 PROCEDURE — 250N000011 HC RX IP 250 OP 636: Performed by: STUDENT IN AN ORGANIZED HEALTH CARE EDUCATION/TRAINING PROGRAM

## 2023-11-29 PROCEDURE — 250N000009 HC RX 250: Performed by: INTERNAL MEDICINE

## 2023-11-29 PROCEDURE — 88305 TISSUE EXAM BY PATHOLOGIST: CPT | Mod: 26 | Performed by: PATHOLOGY

## 2023-11-29 PROCEDURE — 999N000099 HC STATISTIC MODERATE SEDATION < 10 MIN: Performed by: INTERNAL MEDICINE

## 2023-11-29 PROCEDURE — 88305 TISSUE EXAM BY PATHOLOGIST: CPT | Mod: TC | Performed by: INTERNAL MEDICINE

## 2023-11-29 PROCEDURE — 250N000011 HC RX IP 250 OP 636: Performed by: INTERNAL MEDICINE

## 2023-11-29 RX ORDER — EPINEPHRINE 1 MG/ML
0.1 INJECTION, SOLUTION INTRAMUSCULAR; SUBCUTANEOUS
Status: DISCONTINUED | OUTPATIENT
Start: 2023-11-29 | End: 2023-11-29 | Stop reason: HOSPADM

## 2023-11-29 RX ORDER — FENTANYL CITRATE 50 UG/ML
50-100 INJECTION, SOLUTION INTRAMUSCULAR; INTRAVENOUS EVERY 5 MIN PRN
Status: DISCONTINUED | OUTPATIENT
Start: 2023-11-29 | End: 2023-11-29 | Stop reason: HOSPADM

## 2023-11-29 RX ORDER — NALOXONE HYDROCHLORIDE 0.4 MG/ML
0.2 INJECTION, SOLUTION INTRAMUSCULAR; INTRAVENOUS; SUBCUTANEOUS
Status: DISCONTINUED | OUTPATIENT
Start: 2023-11-29 | End: 2023-11-29 | Stop reason: HOSPADM

## 2023-11-29 RX ORDER — FLUMAZENIL 0.1 MG/ML
0.2 INJECTION, SOLUTION INTRAVENOUS
Status: DISCONTINUED | OUTPATIENT
Start: 2023-11-29 | End: 2023-11-29 | Stop reason: HOSPADM

## 2023-11-29 RX ORDER — NALOXONE HYDROCHLORIDE 0.4 MG/ML
0.4 INJECTION, SOLUTION INTRAMUSCULAR; INTRAVENOUS; SUBCUTANEOUS
Status: DISCONTINUED | OUTPATIENT
Start: 2023-11-29 | End: 2023-11-29 | Stop reason: HOSPADM

## 2023-11-29 RX ORDER — ATROPINE SULFATE 0.1 MG/ML
1 INJECTION INTRAVENOUS
Status: DISCONTINUED | OUTPATIENT
Start: 2023-11-29 | End: 2023-11-29 | Stop reason: HOSPADM

## 2023-11-29 RX ORDER — PROCHLORPERAZINE MALEATE 10 MG
10 TABLET ORAL EVERY 6 HOURS PRN
Status: DISCONTINUED | OUTPATIENT
Start: 2023-11-29 | End: 2023-11-29 | Stop reason: HOSPADM

## 2023-11-29 RX ORDER — DIPHENHYDRAMINE HYDROCHLORIDE 50 MG/ML
25-50 INJECTION INTRAMUSCULAR; INTRAVENOUS
Status: DISCONTINUED | OUTPATIENT
Start: 2023-11-29 | End: 2023-11-29 | Stop reason: HOSPADM

## 2023-11-29 RX ORDER — ONDANSETRON 4 MG/1
4 TABLET, ORALLY DISINTEGRATING ORAL EVERY 6 HOURS PRN
Status: DISCONTINUED | OUTPATIENT
Start: 2023-11-29 | End: 2023-11-29 | Stop reason: HOSPADM

## 2023-11-29 RX ORDER — LEVOTHYROXINE SODIUM 100 UG/1
100 TABLET ORAL DAILY
COMMUNITY

## 2023-11-29 RX ORDER — SIMETHICONE 40MG/0.6ML
133 SUSPENSION, DROPS(FINAL DOSAGE FORM)(ML) ORAL
Status: DISCONTINUED | OUTPATIENT
Start: 2023-11-29 | End: 2023-11-29 | Stop reason: HOSPADM

## 2023-11-29 RX ORDER — ONDANSETRON 2 MG/ML
4 INJECTION INTRAMUSCULAR; INTRAVENOUS EVERY 6 HOURS PRN
Status: DISCONTINUED | OUTPATIENT
Start: 2023-11-29 | End: 2023-11-29 | Stop reason: HOSPADM

## 2023-11-29 RX ORDER — LIDOCAINE 40 MG/G
CREAM TOPICAL
Status: DISCONTINUED | OUTPATIENT
Start: 2023-11-29 | End: 2023-11-29 | Stop reason: HOSPADM

## 2023-11-29 RX ADMIN — FENTANYL CITRATE 100 MCG: 50 INJECTION INTRAMUSCULAR; INTRAVENOUS at 14:09

## 2023-11-29 RX ADMIN — GLUCAGON 1 MG: 1 INJECTION, POWDER, LYOPHILIZED, FOR SOLUTION INTRAMUSCULAR; INTRAVENOUS at 11:47

## 2023-11-29 RX ADMIN — MIDAZOLAM 2 MG: 1 INJECTION INTRAMUSCULAR; INTRAVENOUS at 14:12

## 2023-11-29 RX ADMIN — MIDAZOLAM 2 MG: 1 INJECTION INTRAMUSCULAR; INTRAVENOUS at 14:09

## 2023-11-29 RX ADMIN — TOPICAL ANESTHETIC 0.5 ML: 200 SPRAY DENTAL; PERIODONTAL at 14:07

## 2023-11-29 ASSESSMENT — ACTIVITIES OF DAILY LIVING (ADL)
ADLS_ACUITY_SCORE: 37
ADLS_ACUITY_SCORE: 37

## 2023-11-29 NOTE — H&P
Pre-Endoscopy History and Physical     Jaspreet Whaley MRN# [id number]   YOB: 1985 Age: 38 year old     Date of Procedure: 11/29/2023  Primary care provider: [facility]  Type of Endoscopy: Gastroscopy with possible biopsy, possible dilation  Reason for Procedure: foreign body  Type of Anesthesia Anticipated: Conscious Sedation    HPI:    Jaspreet is a 38 year old male who will be undergoing the above procedure.      A history and physical has been performed. The patient's medications and allergies have been reviewed. The risks and benefits of the procedure and the sedation options and risks were discussed with the patient.  All questions were answered and informed consent was obtained.      He denies a personal or family history of anesthesia complications or bleeding disorders.     Patient Active Problem List   Diagnosis    Appendicitis        Past Medical History:   Diagnosis Date    Hypothyroidism         Past Surgical History:   Procedure Laterality Date    APPENDECTOMY OPEN N/A 4/28/2017    Procedure: APPENDECTOMY OPEN;;  Surgeon: Jean Granger MD;  Location:  OR    LAPAROSCOPY DIAGNOSTIC (GENERAL) N/A 4/28/2017    Procedure: LAPAROSCOPY DIAGNOSTIC (GENERAL);  Laparoscopy converted to laparotomy, appendectomy;  Surgeon: Jean Granger MD;  Location:  OR       Social History     Tobacco Use    Smoking status: Never    Smokeless tobacco: Not on file   Substance Use Topics    Alcohol use: Yes     Alcohol/week: 24.0 standard drinks of alcohol     Types: 24 Cans of beer per week       No family history on file.    Prior to Admission medications    Medication Sig Start Date End Date Taking? Authorizing Provider   levothyroxine (SYNTHROID/LEVOTHROID) 100 MCG tablet Take 100 mcg by mouth daily   Yes Reported, Patient   oxyCODONE (ROXICODONE) 5 MG IR tablet Take 1-2 tablets (5-10 mg) by mouth every 6 hours as needed for severe pain  Patient not taking:  "Reported on 11/29/2023 5/6/17   Sridhar Prather PA-C       No Known Allergies     REVIEW OF SYSTEMS:   5 point ROS negative except as noted above in HPI, including Gen., Resp., CV, GI &  system review.    PHYSICAL EXAM:   /84   Pulse 98   Temp 98.4  F (36.9  C) (Temporal)   Resp 18   Ht 1.854 m (6' 1\")   Wt 86.2 kg (190 lb)   SpO2 98%   BMI 25.07 kg/m   Estimated body mass index is 25.07 kg/m  as calculated from the following:    Height as of this encounter: 1.854 m (6' 1\").    Weight as of this encounter: 86.2 kg (190 lb).   GENERAL APPEARANCE: alert, and oriented  MENTAL STATUS: alert  AIRWAY EXAM: Mallampatti Class I (visualization of the soft palate, fauces, uvula, anterior and posterior pillars)  RESP: lungs clear to auscultation - no rales, rhonchi or wheezes  CV: regular rates and rhythm  DIAGNOSTICS:    Not indicated    IMPRESSION   ASA Class 1 - Healthy patient, no medical problems    PLAN:   Plan for Gastroscopy with possible biopsy, possible dilation,foreign body removal. We discussed the risks, benefits and alternatives and the patient wished to proceed.    The above has been forwarded to the consulting provider.      Signed Electronically by: Johnie Dawson MD  November 29, 2023          "

## 2023-11-29 NOTE — PROGRESS NOTES
DISCHARGE                         11/29/2023  3:10 PM  ----------------------------------------------------------------------------  Discharged to: Home  Via: private transportation  Accompanied by: Significant other; Julia  Discharge Instructions: regular diet, no activity restrictions, script for Omeprazole, when to call the MD, aftercare instructions.  Prescriptions: paper script provided  Belongings: All sent with pt  IV: d/c'd  Pt exhibits understanding of above discharge instructions; all questions answered.

## 2023-11-29 NOTE — ED TRIAGE NOTES
Pt eating chicken around 8pm and believes a piece got stuck in esophagus. Pain became worse when lying down. Was sent by urgent care where they tried EZ gas that did not provide relief. Pt has history of food becoming stuck but has not needed medical intervention.

## 2023-11-29 NOTE — PROGRESS NOTES
The chicken was pushed into the stomach. There ws a mucosal tear which I closed with a clip. Biopsies were taken for eosinophilic esophagitis.

## 2023-11-29 NOTE — ED NOTES
PIV started. Glucagon IV given over 1 min. Pt tolerated well. EZ gas given 1 min after Glucagon administration. Currently no success.

## 2023-11-29 NOTE — DISCHARGE INSTRUCTIONS
Return to the emergency department if symptoms are worsening, become concerning, or for any other concerns. Follow-up with your doctor in 2-3 and sooner if needed.  The patient has received a copy of the Provation report the doctor has written and discharge instructions have been discussed with the patient and responsible adult.  All questions were addressed and answered prior to patient discharge.

## 2023-11-29 NOTE — ED PROVIDER NOTES
"  History     Chief Complaint:  food bolus       HPI   Jaspreet Kings Whaley is a 38 year old male otherwise healthy, sent in from urgent care for concerns of esophageal food impaction.  Last night at 8 PM, patient thinks he may have gotten a piece of chicken stuck in his esophagus.  He has had issues before with having difficulties with food passing but he has never had a scope for this and has never had it persistently stay there.  He has not been able to tolerate p.o. now.  He is having pain in his lower esophagus.  At the urgent care he was given EZ gas x 2 without resolution of symptoms so he was sent in here for endoscopy.      Independent Historian:    none    Review of External Notes:  Reviewed urgent care note from today.    Allergies:  No Known Allergies     Physical Exam   Patient Vitals for the past 24 hrs:   BP Temp Temp src Pulse Resp SpO2 Height Weight   11/29/23 1403 (!) 156/98 -- -- 114 14 99 % -- --   11/29/23 1357 (!) 158/97 -- -- 110 14 99 % -- --   11/29/23 1230 138/84 -- -- 98 -- 98 % -- --   11/29/23 1200 (!) 155/83 -- -- 110 -- 98 % -- --   11/29/23 1145 (!) 142/95 -- -- 105 18 99 % -- --   11/29/23 0948 (!) 169/106 98.4  F (36.9  C) Temporal 108 18 97 % 1.854 m (6' 1\") 86.2 kg (190 lb)        Physical Exam  GENERAL: Patient alert, but does appear uncomfortable.  HEAD: Atraumatic.  NECK: No rigidity  CV: Tachycardic and regular, no murmurs rubs or gallops  PULM: CTAB with good aeration; no retractions, rales, rhonchi, or wheezing  ABD: Soft, nontender, nondistended, no guarding  DERM: No rash. Skin warm and dry  EXTREMITY: Moving all extremities without difficulty.        Emergency Department Course        Laboratory: Imaging:   Labs Ordered and Resulted from Time of ED Arrival to Time of ED Departure - No data to display  No orders to display              Emergency Department Course & Assessments:             Interventions:  Medications   lidocaine 1 % 0.1-1 mL (has no administration in time " range)   lidocaine (LMX4) cream (has no administration in time range)   sodium chloride (PF) 0.9% PF flush 3 mL (has no administration in time range)   sodium chloride (PF) 0.9% PF flush 3 mL (has no administration in time range)   sodium chloride (PF) 0.9% PF flush 3 mL (has no administration in time range)   benzocaine 20% (HURRICAINE/TOPEX) 20 % spray 0.5 mL (has no administration in time range)   diphenhydrAMINE (BENADRYL) injection 25-50 mg (has no administration in time range)   simethicone (MYLICON) suspension 133 mg (has no administration in time range)   atropine injection 1 mg (has no administration in time range)   sodium chloride 0.9% BOLUS 500 mL (has no administration in time range)   EPINEPHrine (Anaphylaxis) (ADRENALIN) injection (vial) 0.1 mg (has no administration in time range)   glucagon injection 0.5 mg (has no administration in time range)   midazolam (VERSED) injection 0.5-2 mg (2 mg Intravenous $Given 11/29/23 1412)   flumazenil (ROMAZICON) injection 0.2 mg (has no administration in time range)   fentaNYL (PF) (SUBLIMAZE) injection  mcg (100 mcg Intravenous $Given 11/29/23 1409)   naloxone (NARCAN) injection 0.2 mg (has no administration in time range)     Or   naloxone (NARCAN) injection 0.4 mg (has no administration in time range)     Or   naloxone (NARCAN) injection 0.2 mg (has no administration in time range)     Or   naloxone (NARCAN) injection 0.4 mg (has no administration in time range)   glucagon injection 1 mg (1 mg Intravenous $Given 11/29/23 1147)        Assessments, Independent Interpretation, Consult/Discussion of ManagementTests:     Gastroenterologist Dr. Casey  Social Determinants of Health affecting care:  None    Disposition:  To endoscopy suite    Impression & Plan         Medical Decision Making:  Symptoms consistent with esophageal impaction.     Chronic conditions complicating -recurrent esophageal swallowing issues.    DDx considered sepsis, viscus perforation,  bowel obstruction, or peritonitis, however evaluation not consistent with these etiologies.    Labs not indicated.    Considered imaging, but due to issue relegated to esophagus, do not think indicated.    Patient was already given EZ gas without relief at urgent care so did not repeat.  Did give glucagon.    Consulted Dr. Casey who kindly took patient to the endoscopy suite and per my discussion with nursing will discharge patient from the endoscopy suite.           Diagnosis:    ICD-10-CM    1. Food bolus obstruction of intestine (H)  K56.699     W44.F3XA            Discharge Medications:  Current Discharge Medication List             11/29/2023   Jose M Napoles MD Foss, Kevin, MD  11/29/23 1176

## 2023-11-30 LAB
PATH REPORT.COMMENTS IMP SPEC: NORMAL
PATH REPORT.COMMENTS IMP SPEC: NORMAL
PATH REPORT.FINAL DX SPEC: NORMAL
PATH REPORT.GROSS SPEC: NORMAL
PATH REPORT.MICROSCOPIC SPEC OTHER STN: NORMAL
PATH REPORT.RELEVANT HX SPEC: NORMAL
PHOTO IMAGE: NORMAL

## (undated) DEVICE — SYR 10ML LL W/O NDL

## (undated) DEVICE — SPONGE LAP 18X18" X8435

## (undated) DEVICE — BARRIER SEPRAFILM 3X5" X6 SHEETS 5086-02

## (undated) DEVICE — SUCTION TIP YANKAUER W/O VENT K86

## (undated) DEVICE — SUCTION MANIFOLD NEPTUNE 2 SYS 4 PORT 0702-020-000

## (undated) DEVICE — PACKING IODOFORM STRIP 1/4" 7831

## (undated) DEVICE — SUCTION CANISTER MEDIVAC LINER 3000ML W/LID 65651-530

## (undated) DEVICE — ESU PENCIL W/HOLSTER E2350H

## (undated) DEVICE — NDL 18GA 1.5" 305196

## (undated) DEVICE — SU VICRYL 4-0 PS-2 18" UND J496H

## (undated) DEVICE — ENDO TROCAR BLUNT 100MM W/THRD ANCHOR BLUNTPORT BPT12STS

## (undated) DEVICE — LINEN POUCH DBL 5427

## (undated) DEVICE — STPL SKIN 35W APPOSE 8886803712

## (undated) DEVICE — SU VICRYL 2-0 TIE 12X18" J905T

## (undated) DEVICE — Device

## (undated) DEVICE — SU SILK 3-0 SH-1 CR 8X18" C003D

## (undated) DEVICE — LINEN FULL SHEET 5511

## (undated) DEVICE — LINEN HALF SHEET 5512

## (undated) DEVICE — SU VICRYL 0 UR-6 27" J603H

## (undated) DEVICE — KIT ENDO TURNOVER/PROCEDURE W/CLEAN A SCOPE LINERS 103888

## (undated) DEVICE — STPL ENDO HANDLE GIA ULTRA UNIVERSAL STD EGIAUSTND

## (undated) DEVICE — SU VICRYL 3-0 SH CR 8X18" J774

## (undated) DEVICE — STPL ENDO RELOAD 45MM VASCULAR MEDIUM TAN EGIA45AVM

## (undated) DEVICE — SOL NACL 0.9% INJ 1000ML BAG 2B1324X

## (undated) DEVICE — BNDG ABDOMINAL BINDER 9X30-45" 79-89070

## (undated) DEVICE — GOWN IMPERVIOUS ZONED XLG 9041

## (undated) DEVICE — BLADE KNIFE SURG 10 371110

## (undated) DEVICE — ENDO CANNULA 05MM VERSAONE UNIVERSAL UNVCA5STF

## (undated) DEVICE — GLOVE PROTEXIS BLUE W/NEU-THERA 7.5  2D73EB75

## (undated) DEVICE — ESU GROUND PAD ADULT W/CORD E7507

## (undated) DEVICE — BLADE CLIPPER 3M 9670

## (undated) DEVICE — SU VICRYL 0 TIE 12X18" J906G

## (undated) DEVICE — ENDO BITE BLOCK ADULT OLYMPUS LATEX FREE MAJ-1632

## (undated) DEVICE — DRSG TELFA ISLAND 4X10"

## (undated) DEVICE — BAG CLEAR TRASH 1.3M 39X33" P4040C

## (undated) DEVICE — STPL ENDO RELOAD GIA 45X3.5MM ROTIC 030455

## (undated) DEVICE — STPL ENDO GIA 12MM UNIV 030449

## (undated) DEVICE — SUCTION CANISTER STRAW 65652-008

## (undated) DEVICE — ENDO GELPORT 100/120MM C8XX2

## (undated) DEVICE — SU PDS II 0 CTX 60" Z990G

## (undated) DEVICE — BLADE KNIFE SURG 15 371115

## (undated) DEVICE — SU ETHILON 2-0 PS 18" 585H

## (undated) DEVICE — ENDO FORCEP ENDOJAW BIOPSY 2.8MMX160CM FB-220K

## (undated) DEVICE — ENDO POUCH GOLD 10MM ECATCH 173050G

## (undated) DEVICE — GLOVE PROTEXIS W/NEU-THERA 7.5  2D73TE75

## (undated) DEVICE — ESU CORD MONOPOLAR 10'  E0510

## (undated) DEVICE — DRAIN JACKSON PRATT RESERVOIR 100ML SU130-1305

## (undated) DEVICE — SOL NACL 0.9% IRRIG 1000ML BOTTLE 2F7124

## (undated) DEVICE — CLIP HEMOSTASIS ASSURANCE W18 MM 00711885

## (undated) DEVICE — PREP CHLORAPREP 26ML TINTED ORANGE  260815

## (undated) DEVICE — SUCTION TIP POOLE K770

## (undated) DEVICE — LINEN TOWEL PACK X10 5473

## (undated) DEVICE — DRAIN JACKSON PRATT 15FR ROUND SIL LF JP-2229

## (undated) DEVICE — SUCTION IRR STRYKERFLOW II W/TIP 250-070-520

## (undated) DEVICE — ENDO TROCAR 05MM VERSAONE BLADELESS W/STD FIX CAN NONB5STF

## (undated) DEVICE — SURGICEL HEMOSTAT 3X4" NUKNIT 1943

## (undated) DEVICE — ESU ELEC BLADE 2.75" COATED/INSULATED E1455

## (undated) RX ORDER — LIDOCAINE HYDROCHLORIDE 10 MG/ML
INJECTION, SOLUTION EPIDURAL; INFILTRATION; INTRACAUDAL; PERINEURAL
Status: DISPENSED
Start: 2017-04-28

## (undated) RX ORDER — GLYCOPYRROLATE 0.2 MG/ML
INJECTION INTRAMUSCULAR; INTRAVENOUS
Status: DISPENSED
Start: 2017-04-28

## (undated) RX ORDER — DEXAMETHASONE SODIUM PHOSPHATE 4 MG/ML
INJECTION, SOLUTION INTRA-ARTICULAR; INTRALESIONAL; INTRAMUSCULAR; INTRAVENOUS; SOFT TISSUE
Status: DISPENSED
Start: 2017-04-28

## (undated) RX ORDER — KETAMINE HYDROCHLORIDE 10 MG/ML
INJECTION, SOLUTION INTRAMUSCULAR; INTRAVENOUS
Status: DISPENSED
Start: 2017-04-28

## (undated) RX ORDER — FENTANYL CITRATE 50 UG/ML
INJECTION, SOLUTION INTRAMUSCULAR; INTRAVENOUS
Status: DISPENSED
Start: 2023-11-29

## (undated) RX ORDER — CEFAZOLIN SODIUM 1 G/3ML
INJECTION, POWDER, FOR SOLUTION INTRAMUSCULAR; INTRAVENOUS
Status: DISPENSED
Start: 2017-04-28

## (undated) RX ORDER — FENTANYL CITRATE 50 UG/ML
INJECTION, SOLUTION INTRAMUSCULAR; INTRAVENOUS
Status: DISPENSED
Start: 2017-04-28

## (undated) RX ORDER — BUPIVACAINE HYDROCHLORIDE AND EPINEPHRINE 5; 5 MG/ML; UG/ML
INJECTION, SOLUTION EPIDURAL; INTRACAUDAL; PERINEURAL
Status: DISPENSED
Start: 2017-04-28

## (undated) RX ORDER — PROPOFOL 10 MG/ML
INJECTION, EMULSION INTRAVENOUS
Status: DISPENSED
Start: 2017-04-28